# Patient Record
Sex: MALE | Race: WHITE | NOT HISPANIC OR LATINO | Employment: UNEMPLOYED | URBAN - METROPOLITAN AREA
[De-identification: names, ages, dates, MRNs, and addresses within clinical notes are randomized per-mention and may not be internally consistent; named-entity substitution may affect disease eponyms.]

---

## 2023-12-26 ENCOUNTER — APPOINTMENT (OUTPATIENT)
Dept: GENERAL RADIOLOGY | Facility: HOSPITAL | Age: 58
DRG: 194 | End: 2023-12-26
Payer: OTHER GOVERNMENT

## 2023-12-26 ENCOUNTER — HOSPITAL ENCOUNTER (INPATIENT)
Facility: HOSPITAL | Age: 58
LOS: 3 days | Discharge: HOME | DRG: 194 | End: 2023-12-30
Attending: EMERGENCY MEDICINE | Admitting: INTERNAL MEDICINE
Payer: OTHER GOVERNMENT

## 2023-12-26 DIAGNOSIS — E87.1 HYPONATREMIA: ICD-10-CM

## 2023-12-26 DIAGNOSIS — J10.1 INFLUENZA A: Primary | ICD-10-CM

## 2023-12-26 DIAGNOSIS — I15.9 SECONDARY HYPERTENSION: ICD-10-CM

## 2023-12-26 PROBLEM — E66.9 OBESITY (BMI 30-39.9): Status: ACTIVE | Noted: 2023-12-26

## 2023-12-26 PROBLEM — E87.6 HYPOKALEMIA: Status: ACTIVE | Noted: 2023-12-26

## 2023-12-26 PROBLEM — I10 ESSENTIAL HYPERTENSION: Status: ACTIVE | Noted: 2023-12-26

## 2023-12-26 PROBLEM — R79.89 ELEVATED LFTS: Status: ACTIVE | Noted: 2023-12-26

## 2023-12-26 LAB
ALBUMIN SERPL-MCNC: 4.3 G/DL (ref 3.5–5.2)
ALBUMIN/GLOB SERPL: 1.4 G/DL
ALP SERPL-CCNC: 123 U/L (ref 39–117)
ALT SERPL W P-5'-P-CCNC: 95 U/L (ref 1–41)
ANION GAP SERPL CALCULATED.3IONS-SCNC: 14 MMOL/L (ref 10–20)
ANION GAP SERPL CALCULATED.3IONS-SCNC: 15 MMOL/L (ref 5–15)
AST SERPL-CCNC: 112 U/L (ref 1–40)
B PARAPERT DNA SPEC QL NAA+PROBE: NOT DETECTED
B PERT DNA SPEC QL NAA+PROBE: NOT DETECTED
BASOPHILS # BLD AUTO: 0 10*3/MM3 (ref 0–0.2)
BASOPHILS # BLD AUTO: 0 10*3/MM3 (ref 0–0.2)
BASOPHILS NFR BLD AUTO: 0.1 % (ref 0–1.5)
BASOPHILS NFR BLD AUTO: 0.2 % (ref 0–1.5)
BILIRUB SERPL-MCNC: 0.6 MG/DL (ref 0–1.2)
BUN BLDA-MCNC: 6 MG/DL (ref 8–26)
BUN SERPL-MCNC: 7 MG/DL (ref 6–20)
BUN/CREAT SERPL: 10 (ref 7–25)
C PNEUM DNA NPH QL NAA+NON-PROBE: NOT DETECTED
CA-I BLDA-SCNC: 0.95 MMOL/L (ref 1.12–1.32)
CALCIUM SPEC-SCNC: 8.9 MG/DL (ref 8.6–10.5)
CHLORIDE BLDA-SCNC: 73 MMOL/L (ref 98–109)
CHLORIDE SERPL-SCNC: 68 MMOL/L (ref 98–107)
CO2 BLDA-SCNC: 25 MMOL/L (ref 24–29)
CO2 SERPL-SCNC: 28 MMOL/L (ref 22–29)
CREAT BLDA-MCNC: 0.6 MG/DL (ref 0.6–1.3)
CREAT SERPL-MCNC: 0.7 MG/DL (ref 0.76–1.27)
DEPRECATED RDW RBC AUTO: 42 FL (ref 37–54)
DEPRECATED RDW RBC AUTO: 42.4 FL (ref 37–54)
EGFRCR SERPLBLD CKD-EPI 2021: 106.1 ML/MIN/1.73
EGFRCR SERPLBLD CKD-EPI 2021: 111.2 ML/MIN/1.73
EOSINOPHIL # BLD AUTO: 0 10*3/MM3 (ref 0–0.4)
EOSINOPHIL # BLD AUTO: 0 10*3/MM3 (ref 0–0.4)
EOSINOPHIL NFR BLD AUTO: 0 % (ref 0.3–6.2)
EOSINOPHIL NFR BLD AUTO: 0.1 % (ref 0.3–6.2)
ERYTHROCYTE [DISTWIDTH] IN BLOOD BY AUTOMATED COUNT: 12.4 % (ref 12.3–15.4)
ERYTHROCYTE [DISTWIDTH] IN BLOOD BY AUTOMATED COUNT: 12.6 % (ref 12.3–15.4)
ETHANOL UR QL: <0.01 %
FLUAV H1 2009 PAND RNA NPH QL NAA+PROBE: DETECTED
FLUBV RNA ISLT QL NAA+PROBE: NOT DETECTED
GLOBULIN UR ELPH-MCNC: 3 GM/DL
GLUCOSE BLDC GLUCOMTR-MCNC: 143 MG/DL (ref 70–105)
GLUCOSE SERPL-MCNC: 134 MG/DL (ref 65–99)
HADV DNA SPEC NAA+PROBE: NOT DETECTED
HCOV 229E RNA SPEC QL NAA+PROBE: NOT DETECTED
HCOV HKU1 RNA SPEC QL NAA+PROBE: NOT DETECTED
HCOV NL63 RNA SPEC QL NAA+PROBE: NOT DETECTED
HCOV OC43 RNA SPEC QL NAA+PROBE: NOT DETECTED
HCT VFR BLD AUTO: 41.7 % (ref 37.5–51)
HCT VFR BLD AUTO: 47.4 % (ref 37.5–51)
HCT VFR BLDA CALC: 45 % (ref 38–51)
HGB BLD-MCNC: 15.1 G/DL (ref 13–17.7)
HGB BLD-MCNC: 17.1 G/DL (ref 13–17.7)
HGB BLDA-MCNC: 15.3 G/DL (ref 12–17)
HMPV RNA NPH QL NAA+NON-PROBE: NOT DETECTED
HPIV1 RNA ISLT QL NAA+PROBE: NOT DETECTED
HPIV2 RNA SPEC QL NAA+PROBE: NOT DETECTED
HPIV3 RNA NPH QL NAA+PROBE: NOT DETECTED
HPIV4 P GENE NPH QL NAA+PROBE: NOT DETECTED
LYMPHOCYTES # BLD AUTO: 0.5 10*3/MM3 (ref 0.7–3.1)
LYMPHOCYTES # BLD AUTO: 0.5 10*3/MM3 (ref 0.7–3.1)
LYMPHOCYTES NFR BLD AUTO: 6.8 % (ref 19.6–45.3)
LYMPHOCYTES NFR BLD AUTO: 9.4 % (ref 19.6–45.3)
M PNEUMO IGG SER IA-ACNC: NOT DETECTED
MCH RBC QN AUTO: 33.2 PG (ref 26.6–33)
MCH RBC QN AUTO: 34.2 PG (ref 26.6–33)
MCHC RBC AUTO-ENTMCNC: 36.1 G/DL (ref 31.5–35.7)
MCHC RBC AUTO-ENTMCNC: 36.2 G/DL (ref 31.5–35.7)
MCV RBC AUTO: 91.8 FL (ref 79–97)
MCV RBC AUTO: 94.8 FL (ref 79–97)
MONOCYTES # BLD AUTO: 0.2 10*3/MM3 (ref 0.1–0.9)
MONOCYTES # BLD AUTO: 0.4 10*3/MM3 (ref 0.1–0.9)
MONOCYTES NFR BLD AUTO: 5 % (ref 5–12)
MONOCYTES NFR BLD AUTO: 6.1 % (ref 5–12)
NEUTROPHILS NFR BLD AUTO: 4.1 10*3/MM3 (ref 1.7–7)
NEUTROPHILS NFR BLD AUTO: 5.9 10*3/MM3 (ref 1.7–7)
NEUTROPHILS NFR BLD AUTO: 85.5 % (ref 42.7–76)
NEUTROPHILS NFR BLD AUTO: 86.8 % (ref 42.7–76)
NRBC BLD AUTO-RTO: 0.1 /100 WBC (ref 0–0.2)
NRBC BLD AUTO-RTO: 0.2 /100 WBC (ref 0–0.2)
NT-PROBNP SERPL-MCNC: 105.7 PG/ML (ref 0–900)
PLATELET # BLD AUTO: 177 10*3/MM3 (ref 140–450)
PLATELET # BLD AUTO: 222 10*3/MM3 (ref 140–450)
PMV BLD AUTO: 8.4 FL (ref 6–12)
PMV BLD AUTO: 8.4 FL (ref 6–12)
POTASSIUM BLDA-SCNC: 3.1 MMOL/L (ref 3.5–4.9)
POTASSIUM SERPL-SCNC: 3.1 MMOL/L (ref 3.5–5.2)
PROT SERPL-MCNC: 7.3 G/DL (ref 6–8.5)
RBC # BLD AUTO: 4.54 10*6/MM3 (ref 4.14–5.8)
RBC # BLD AUTO: 5 10*6/MM3 (ref 4.14–5.8)
RHINOVIRUS RNA SPEC NAA+PROBE: NOT DETECTED
RSV RNA NPH QL NAA+NON-PROBE: NOT DETECTED
SARS-COV-2 RNA NPH QL NAA+NON-PROBE: NOT DETECTED
SODIUM BLD-SCNC: 108 MMOL/L (ref 138–146)
SODIUM SERPL-SCNC: 111 MMOL/L (ref 136–145)
TROPONIN T SERPL HS-MCNC: 9 NG/L
WBC NRBC COR # BLD AUTO: 4.8 10*3/MM3 (ref 3.4–10.8)
WBC NRBC COR # BLD AUTO: 6.8 10*3/MM3 (ref 3.4–10.8)

## 2023-12-26 PROCEDURE — 71046 X-RAY EXAM CHEST 2 VIEWS: CPT

## 2023-12-26 PROCEDURE — 99285 EMERGENCY DEPT VISIT HI MDM: CPT

## 2023-12-26 PROCEDURE — 84484 ASSAY OF TROPONIN QUANT: CPT | Performed by: PHYSICIAN ASSISTANT

## 2023-12-26 PROCEDURE — 85025 COMPLETE CBC W/AUTO DIFF WBC: CPT | Performed by: NURSE PRACTITIONER

## 2023-12-26 PROCEDURE — 85014 HEMATOCRIT: CPT

## 2023-12-26 PROCEDURE — 82077 ASSAY SPEC XCP UR&BREATH IA: CPT | Performed by: NURSE PRACTITIONER

## 2023-12-26 PROCEDURE — 80047 BASIC METABLC PNL IONIZED CA: CPT

## 2023-12-26 PROCEDURE — 93005 ELECTROCARDIOGRAM TRACING: CPT

## 2023-12-26 PROCEDURE — 83880 ASSAY OF NATRIURETIC PEPTIDE: CPT | Performed by: PHYSICIAN ASSISTANT

## 2023-12-26 PROCEDURE — 80053 COMPREHEN METABOLIC PANEL: CPT | Performed by: PHYSICIAN ASSISTANT

## 2023-12-26 PROCEDURE — G0378 HOSPITAL OBSERVATION PER HR: HCPCS

## 2023-12-26 PROCEDURE — 85025 COMPLETE CBC W/AUTO DIFF WBC: CPT | Performed by: PHYSICIAN ASSISTANT

## 2023-12-26 PROCEDURE — 25810000003 SODIUM CHLORIDE 0.9 % SOLUTION: Performed by: EMERGENCY MEDICINE

## 2023-12-26 PROCEDURE — 0202U NFCT DS 22 TRGT SARS-COV-2: CPT | Performed by: PHYSICIAN ASSISTANT

## 2023-12-26 PROCEDURE — 83036 HEMOGLOBIN GLYCOSYLATED A1C: CPT | Performed by: NURSE PRACTITIONER

## 2023-12-26 PROCEDURE — 93005 ELECTROCARDIOGRAM TRACING: CPT | Performed by: EMERGENCY MEDICINE

## 2023-12-26 RX ORDER — ACETAMINOPHEN 325 MG/1
650 TABLET ORAL EVERY 6 HOURS PRN
Status: DISCONTINUED | OUTPATIENT
Start: 2023-12-26 | End: 2023-12-30 | Stop reason: HOSPADM

## 2023-12-26 RX ORDER — AMLODIPINE BESYLATE 5 MG/1
10 TABLET ORAL ONCE
Qty: 2 TABLET | Refills: 0 | Status: COMPLETED | OUTPATIENT
Start: 2023-12-26 | End: 2023-12-26

## 2023-12-26 RX ORDER — HYDROCODONE BITARTRATE AND ACETAMINOPHEN 5; 325 MG/1; MG/1
1 TABLET ORAL ONCE AS NEEDED
Status: COMPLETED | OUTPATIENT
Start: 2023-12-26 | End: 2023-12-27

## 2023-12-26 RX ORDER — BENZONATATE 100 MG/1
100 CAPSULE ORAL 3 TIMES DAILY PRN
Status: DISCONTINUED | OUTPATIENT
Start: 2023-12-26 | End: 2023-12-30 | Stop reason: HOSPADM

## 2023-12-26 RX ORDER — ASPIRIN 81 MG/1
81 TABLET ORAL DAILY
COMMUNITY

## 2023-12-26 RX ORDER — OMEPRAZOLE 20 MG/1
20 CAPSULE, DELAYED RELEASE ORAL DAILY
COMMUNITY

## 2023-12-26 RX ORDER — SODIUM CHLORIDE 0.9 % (FLUSH) 0.9 %
10 SYRINGE (ML) INJECTION AS NEEDED
Status: DISCONTINUED | OUTPATIENT
Start: 2023-12-26 | End: 2023-12-30 | Stop reason: HOSPADM

## 2023-12-26 RX ORDER — LABETALOL HYDROCHLORIDE 5 MG/ML
10 INJECTION, SOLUTION INTRAVENOUS EVERY 6 HOURS PRN
Status: DISCONTINUED | OUTPATIENT
Start: 2023-12-26 | End: 2023-12-30 | Stop reason: HOSPADM

## 2023-12-26 RX ORDER — ALBUTEROL SULFATE 90 UG/1
2 AEROSOL, METERED RESPIRATORY (INHALATION) EVERY 4 HOURS PRN
Status: DISCONTINUED | OUTPATIENT
Start: 2023-12-26 | End: 2023-12-30 | Stop reason: HOSPADM

## 2023-12-26 RX ORDER — SODIUM CHLORIDE 9 MG/ML
150 INJECTION, SOLUTION INTRAVENOUS CONTINUOUS
Status: DISCONTINUED | OUTPATIENT
Start: 2023-12-26 | End: 2023-12-27

## 2023-12-26 RX ORDER — CETIRIZINE HYDROCHLORIDE 10 MG/1
10 TABLET ORAL DAILY
COMMUNITY

## 2023-12-26 RX ORDER — HYDROCODONE BITARTRATE AND HOMATROPINE METHYLBROMIDE ORAL SOLUTION 5; 1.5 MG/5ML; MG/5ML
5 LIQUID ORAL ONCE AS NEEDED
Status: COMPLETED | OUTPATIENT
Start: 2023-12-26 | End: 2023-12-26

## 2023-12-26 RX ORDER — OXYMETAZOLINE HYDROCHLORIDE 0.05 G/100ML
2 SPRAY NASAL 2 TIMES DAILY
Qty: 6 ML | Refills: 0 | Status: COMPLETED | OUTPATIENT
Start: 2023-12-26 | End: 2023-12-29

## 2023-12-26 RX ADMIN — HYDROCODONE BITARTRATE AND HOMATROPINE METHYLBROMIDE 5 ML: 1.5; 5 SOLUTION ORAL at 23:44

## 2023-12-26 RX ADMIN — AMLODIPINE BESYLATE 10 MG: 5 TABLET ORAL at 18:35

## 2023-12-26 RX ADMIN — SODIUM CHLORIDE 150 ML/HR: 9 INJECTION, SOLUTION INTRAVENOUS at 18:37

## 2023-12-26 RX ADMIN — Medication 2 SPRAY: at 23:43

## 2023-12-26 RX ADMIN — METOPROLOL TARTRATE 25 MG: 25 TABLET, FILM COATED ORAL at 23:44

## 2023-12-26 NOTE — ED PROVIDER NOTES
Subjective   Provider in Triage Note  Patient is a 58-year-old male PMH significant for hypertension presenting to the ED with complaints of cough congestion for the past 5 days.  He reports he has had increased dyspnea and pleuritic type chest pain today.  He states his chest pain is worse with coughing spells.  He denies any unilateral leg swelling.  Patient's wife in triage also reports she has been sick with similar symptoms.  They report subjective fever no chills.  No edema.    Due to significant overcrowding in the emergency department patient was initially seen and evaluated in triage.  Provider in triage recommended patient placed in treatment area to initiate therapy and movement to an ER bed as soon as possible.        History of Present Illness  I interviewed the patient for HPI and agree with the nurse practitioner providing triage note as noted above  Review of Systems    No past medical history on file.    No Known Allergies    No past surgical history on file.    No family history on file.    Social History     Socioeconomic History    Marital status:            Objective   Physical Exam  Neck has no adenopathy JVD or bruits.  Lungs are clear.  Heart has regular rate rhythm without murmur rub or gallop.  Chest is nontender.  Abdomen is soft nontender.  Back no tenderness.  Extremities M is no cyanosis or edema.  Procedures  My EKG interpretation shows normal sinus rhythm at a rate of 80 with no acute ST change         ED Course      Results for orders placed or performed during the hospital encounter of 12/26/23   Respiratory Panel PCR w/COVID-19(SARS-CoV-2) FABY/BRIAN/KARLIE/PAD/COR/KYLAH In-House, NP Swab in UTM/VTM, 2 HR TAT - Swab, Nasopharynx    Specimen: Nasopharynx; Swab   Result Value Ref Range    ADENOVIRUS, PCR Not Detected Not Detected    Coronavirus 229E Not Detected Not Detected    Coronavirus HKU1 Not Detected Not Detected    Coronavirus NL63 Not Detected Not Detected    Coronavirus OC43  Not Detected Not Detected    COVID19 Not Detected Not Detected - Ref. Range    Human Metapneumovirus Not Detected Not Detected    Human Rhinovirus/Enterovirus Not Detected Not Detected    Influenza A H1 2009 PCR Detected (A) Not Detected    Influenza B PCR Not Detected Not Detected    Parainfluenza Virus 1 Not Detected Not Detected    Parainfluenza Virus 2 Not Detected Not Detected    Parainfluenza Virus 3 Not Detected Not Detected    Parainfluenza Virus 4 Not Detected Not Detected    RSV, PCR Not Detected Not Detected    Bordetella pertussis pcr Not Detected Not Detected    Bordetella parapertussis PCR Not Detected Not Detected    Chlamydophila pneumoniae PCR Not Detected Not Detected    Mycoplasma pneumo by PCR Not Detected Not Detected   Comprehensive Metabolic Panel    Specimen: Blood   Result Value Ref Range    Glucose 134 (H) 65 - 99 mg/dL    BUN 7 6 - 20 mg/dL    Creatinine 0.70 (L) 0.76 - 1.27 mg/dL    Sodium 111 (C) 136 - 145 mmol/L    Potassium 3.1 (L) 3.5 - 5.2 mmol/L    Chloride 68 (L) 98 - 107 mmol/L    CO2 28.0 22.0 - 29.0 mmol/L    Calcium 8.9 8.6 - 10.5 mg/dL    Total Protein 7.3 6.0 - 8.5 g/dL    Albumin 4.3 3.5 - 5.2 g/dL    ALT (SGPT) 95 (H) 1 - 41 U/L    AST (SGOT) 112 (H) 1 - 40 U/L    Alkaline Phosphatase 123 (H) 39 - 117 U/L    Total Bilirubin 0.6 0.0 - 1.2 mg/dL    Globulin 3.0 gm/dL    A/G Ratio 1.4 g/dL    BUN/Creatinine Ratio 10.0 7.0 - 25.0    Anion Gap 15.0 5.0 - 15.0 mmol/L    eGFR 106.1 >60.0 mL/min/1.73   BNP    Specimen: Blood   Result Value Ref Range    proBNP 105.7 0.0 - 900.0 pg/mL   Single High Sensitivity Troponin T    Specimen: Blood   Result Value Ref Range    HS Troponin T 9 <22 ng/L   CBC Auto Differential    Specimen: Blood   Result Value Ref Range    WBC 6.80 3.40 - 10.80 10*3/mm3    RBC 5.00 4.14 - 5.80 10*6/mm3    Hemoglobin 17.1 13.0 - 17.7 g/dL    Hematocrit 47.4 37.5 - 51.0 %    MCV 94.8 79.0 - 97.0 fL    MCH 34.2 (H) 26.6 - 33.0 pg    MCHC 36.1 (H) 31.5 - 35.7 g/dL     RDW 12.6 12.3 - 15.4 %    RDW-SD 42.0 37.0 - 54.0 fl    MPV 8.4 6.0 - 12.0 fL    Platelets 222 140 - 450 10*3/mm3    Neutrophil % 86.8 (H) 42.7 - 76.0 %    Lymphocyte % 6.8 (L) 19.6 - 45.3 %    Monocyte % 6.1 5.0 - 12.0 %    Eosinophil % 0.1 (L) 0.3 - 6.2 %    Basophil % 0.2 0.0 - 1.5 %    Neutrophils, Absolute 5.90 1.70 - 7.00 10*3/mm3    Lymphocytes, Absolute 0.50 (L) 0.70 - 3.10 10*3/mm3    Monocytes, Absolute 0.40 0.10 - 0.90 10*3/mm3    Eosinophils, Absolute 0.00 0.00 - 0.40 10*3/mm3    Basophils, Absolute 0.00 0.00 - 0.20 10*3/mm3    nRBC 0.2 0.0 - 0.2 /100 WBC   POC CHEM 8    Specimen: Venous Blood   Result Value Ref Range    Glucose 143 (H) 70 - 105 mg/dL    BUN 6 (L) 8 - 26 mg/dL    Creatinine 0.60 0.60 - 1.30 mg/dL    Sodium 108 (L) 138 - 146 mmol/L    POC Potassium 3.1 (L) 3.5 - 4.9 mmol/L    Chloride 73 (L) 98 - 109 mmol/L    Total CO2 25 24 - 29 mmol/L    Anion Gap 14.0 10.0 - 20.0 mmol/L    Hemoglobin 15.3 12.0 - 17.0 g/dL    Hematocrit 45 38 - 51 %    Ionized Calcium 0.95 (L) 1.12 - 1.32 mmol/L    eGFR 111.2 >60.0 mL/min/1.73   ECG 12 Lead Chest Pain   Result Value Ref Range    QT Interval 405 ms    QTC Interval 467 ms     XR Chest 2 View    Result Date: 12/26/2023  Impression: Cardiomegaly. No acute cardiopulmonary abnormality. Electronically Signed: Madai Matta MD  12/26/2023 5:35 PM EST  Workstation ID: [id number]                                            Medical Decision Making  Chest x-ray interpretation with cardiomegaly without effusion or infiltrate.  Metabolic panel shows sodium at 111 with potassium 3.1.  There is no renal insufficiency.  Patient has no leukocytosis no left shift and no anemia.  BNP was 500.  Troponin was normal.  Viral panel is positive for influenza A.  Patient has had longstanding hypertension that he has not taken medication for.  Patient will be given Norvasc p.o.  Will be admitted for influenza A hypertension and severe hyponatremia.  Patient will be started on IV  sodium replacement.  I did speak to the on-call hospitalist.    Amount and/or Complexity of Data Reviewed  Labs: ordered. Decision-making details documented in ED Course.  Radiology: ordered and independent interpretation performed.  ECG/medicine tests: ordered and independent interpretation performed.    Risk  Prescription drug management.  Decision regarding hospitalization.        Final diagnoses:   Influenza A   Hyponatremia   Secondary hypertension       ED Disposition  ED Disposition       ED Disposition   Decision to Admit    Condition   --    Comment   --               No follow-up provider specified.       Medication List      No changes were made to your prescriptions during this visit.            Hao Rangel MD  12/26/23 9499

## 2023-12-26 NOTE — Clinical Note
Level of Care: Progressive Care [20]   Diagnosis: Hyponatremia [198519]   Admitting Physician: DEONDRE CAMEJO [[id number]]   Attending Physician: DEONDRE CAMEJO [[id number]]   Bed Request Comments: cardiac monitor

## 2023-12-27 ENCOUNTER — TELEPHONE (OUTPATIENT)
Dept: FAMILY MEDICINE CLINIC | Facility: CLINIC | Age: 58
End: 2023-12-27

## 2023-12-27 LAB
ANION GAP SERPL CALCULATED.3IONS-SCNC: 14 MMOL/L (ref 5–15)
ANION GAP SERPL CALCULATED.3IONS-SCNC: 14 MMOL/L (ref 5–15)
ANION GAP SERPL CALCULATED.3IONS-SCNC: 9 MMOL/L (ref 5–15)
BUN SERPL-MCNC: 10 MG/DL (ref 6–20)
BUN SERPL-MCNC: 7 MG/DL (ref 6–20)
BUN SERPL-MCNC: 8 MG/DL (ref 6–20)
BUN/CREAT SERPL: 11.3 (ref 7–25)
BUN/CREAT SERPL: 12.1 (ref 7–25)
BUN/CREAT SERPL: 12.3 (ref 7–25)
CALCIUM SPEC-SCNC: 8.6 MG/DL (ref 8.6–10.5)
CALCIUM SPEC-SCNC: 8.7 MG/DL (ref 8.6–10.5)
CALCIUM SPEC-SCNC: 8.8 MG/DL (ref 8.6–10.5)
CHLORIDE SERPL-SCNC: 69 MMOL/L (ref 98–107)
CHLORIDE SERPL-SCNC: 75 MMOL/L (ref 98–107)
CHLORIDE SERPL-SCNC: 83 MMOL/L (ref 98–107)
CO2 SERPL-SCNC: 23 MMOL/L (ref 22–29)
CO2 SERPL-SCNC: 25 MMOL/L (ref 22–29)
CO2 SERPL-SCNC: 27 MMOL/L (ref 22–29)
CREAT SERPL-MCNC: 0.58 MG/DL (ref 0.76–1.27)
CREAT SERPL-MCNC: 0.71 MG/DL (ref 0.76–1.27)
CREAT SERPL-MCNC: 0.81 MG/DL (ref 0.76–1.27)
EGFRCR SERPLBLD CKD-EPI 2021: 101.6 ML/MIN/1.73
EGFRCR SERPLBLD CKD-EPI 2021: 105.7 ML/MIN/1.73
EGFRCR SERPLBLD CKD-EPI 2021: 112.3 ML/MIN/1.73
GLUCOSE SERPL-MCNC: 108 MG/DL (ref 65–99)
GLUCOSE SERPL-MCNC: 122 MG/DL (ref 65–99)
GLUCOSE SERPL-MCNC: 161 MG/DL (ref 65–99)
HBA1C MFR BLD: 5.8 % (ref 4.8–5.6)
MAGNESIUM SERPL-MCNC: 1.8 MG/DL (ref 1.6–2.6)
NT-PROBNP SERPL-MCNC: 163.1 PG/ML (ref 0–900)
OSMOLALITY SERPL: 235 MOSM/KG (ref 275–295)
OSMOLALITY SERPL: 245 MOSM/KG (ref 275–295)
OSMOLALITY SERPL: 247 MOSM/KG (ref 275–295)
OSMOLALITY UR: 230 MOSM/KG (ref 300–800)
POTASSIUM SERPL-SCNC: 3.6 MMOL/L (ref 3.5–5.2)
POTASSIUM SERPL-SCNC: 3.7 MMOL/L (ref 3.5–5.2)
POTASSIUM SERPL-SCNC: 4.1 MMOL/L (ref 3.5–5.2)
QT INTERVAL: 405 MS
QTC INTERVAL: 467 MS
SODIUM SERPL-SCNC: 108 MMOL/L (ref 136–145)
SODIUM SERPL-SCNC: 112 MMOL/L (ref 136–145)
SODIUM SERPL-SCNC: 118 MMOL/L (ref 136–145)
SODIUM SERPL-SCNC: 119 MMOL/L (ref 136–145)
SODIUM UR-SCNC: <20 MMOL/L
TSH SERPL DL<=0.05 MIU/L-ACNC: 5.12 UIU/ML (ref 0.27–4.2)

## 2023-12-27 PROCEDURE — 02HV33Z INSERTION OF INFUSION DEVICE INTO SUPERIOR VENA CAVA, PERCUTANEOUS APPROACH: ICD-10-PCS | Performed by: INTERNAL MEDICINE

## 2023-12-27 PROCEDURE — 0 DEXTROSE 5 % SOLUTION: Performed by: INTERNAL MEDICINE

## 2023-12-27 PROCEDURE — 83930 ASSAY OF BLOOD OSMOLALITY: CPT | Performed by: INTERNAL MEDICINE

## 2023-12-27 PROCEDURE — 25810000003 SODIUM CHLORIDE 3 % SOLUTION: Performed by: INTERNAL MEDICINE

## 2023-12-27 PROCEDURE — 25010000002 THIAMINE HCL 200 MG/2ML SOLUTION

## 2023-12-27 PROCEDURE — 83735 ASSAY OF MAGNESIUM: CPT

## 2023-12-27 PROCEDURE — 84443 ASSAY THYROID STIM HORMONE: CPT | Performed by: INTERNAL MEDICINE

## 2023-12-27 PROCEDURE — 83935 ASSAY OF URINE OSMOLALITY: CPT | Performed by: INTERNAL MEDICINE

## 2023-12-27 PROCEDURE — 80048 BASIC METABOLIC PNL TOTAL CA: CPT | Performed by: INTERNAL MEDICINE

## 2023-12-27 PROCEDURE — 84295 ASSAY OF SERUM SODIUM: CPT | Performed by: INTERNAL MEDICINE

## 2023-12-27 PROCEDURE — 84300 ASSAY OF URINE SODIUM: CPT | Performed by: INTERNAL MEDICINE

## 2023-12-27 PROCEDURE — 83880 ASSAY OF NATRIURETIC PEPTIDE: CPT | Performed by: INTERNAL MEDICINE

## 2023-12-27 PROCEDURE — C1751 CATH, INF, PER/CENT/MIDLINE: HCPCS

## 2023-12-27 RX ORDER — OSELTAMIVIR PHOSPHATE 75 MG/1
75 CAPSULE ORAL EVERY 12 HOURS SCHEDULED
Status: DISCONTINUED | OUTPATIENT
Start: 2023-12-27 | End: 2023-12-28

## 2023-12-27 RX ORDER — THIAMINE HYDROCHLORIDE 100 MG/ML
200 INJECTION, SOLUTION INTRAMUSCULAR; INTRAVENOUS EVERY 8 HOURS SCHEDULED
Status: DISCONTINUED | OUTPATIENT
Start: 2023-12-27 | End: 2023-12-30 | Stop reason: HOSPADM

## 2023-12-27 RX ORDER — HYDROCODONE BITARTRATE AND HOMATROPINE METHYLBROMIDE ORAL SOLUTION 5; 1.5 MG/5ML; MG/5ML
5 LIQUID ORAL EVERY 6 HOURS PRN
Status: COMPLETED | OUTPATIENT
Start: 2023-12-27 | End: 2023-12-27

## 2023-12-27 RX ORDER — SODIUM CHLORIDE 0.9 % (FLUSH) 0.9 %
10 SYRINGE (ML) INJECTION EVERY 12 HOURS SCHEDULED
Status: DISCONTINUED | OUTPATIENT
Start: 2023-12-27 | End: 2023-12-30 | Stop reason: HOSPADM

## 2023-12-27 RX ORDER — SODIUM CHLORIDE 0.9 % (FLUSH) 0.9 %
20 SYRINGE (ML) INJECTION AS NEEDED
Status: DISCONTINUED | OUTPATIENT
Start: 2023-12-27 | End: 2023-12-30 | Stop reason: HOSPADM

## 2023-12-27 RX ORDER — POTASSIUM CHLORIDE 20 MEQ/1
40 TABLET, EXTENDED RELEASE ORAL ONCE
Status: COMPLETED | OUTPATIENT
Start: 2023-12-27 | End: 2023-12-27

## 2023-12-27 RX ORDER — HYDROCODONE BITARTRATE AND ACETAMINOPHEN 5; 325 MG/1; MG/1
1 TABLET ORAL EVERY 6 HOURS PRN
Status: DISCONTINUED | OUTPATIENT
Start: 2023-12-27 | End: 2023-12-30 | Stop reason: HOSPADM

## 2023-12-27 RX ORDER — 3% SODIUM CHLORIDE 3 G/100ML
25 INJECTION, SOLUTION INTRAVENOUS CONTINUOUS
Status: DISCONTINUED | OUTPATIENT
Start: 2023-12-27 | End: 2023-12-27

## 2023-12-27 RX ORDER — FOLIC ACID 1 MG/1
1 TABLET ORAL DAILY
Status: DISCONTINUED | OUTPATIENT
Start: 2023-12-27 | End: 2023-12-30 | Stop reason: HOSPADM

## 2023-12-27 RX ORDER — AMLODIPINE BESYLATE 5 MG/1
5 TABLET ORAL
Status: DISCONTINUED | OUTPATIENT
Start: 2023-12-27 | End: 2023-12-30 | Stop reason: HOSPADM

## 2023-12-27 RX ORDER — SODIUM CHLORIDE 0.9 % (FLUSH) 0.9 %
10 SYRINGE (ML) INJECTION AS NEEDED
Status: DISCONTINUED | OUTPATIENT
Start: 2023-12-27 | End: 2023-12-30 | Stop reason: HOSPADM

## 2023-12-27 RX ORDER — DEXTROSE MONOHYDRATE 50 MG/ML
50 INJECTION, SOLUTION INTRAVENOUS CONTINUOUS
Status: DISPENSED | OUTPATIENT
Start: 2023-12-27 | End: 2023-12-27

## 2023-12-27 RX ORDER — DEXTROSE MONOHYDRATE 50 MG/ML
100 INJECTION, SOLUTION INTRAVENOUS CONTINUOUS
Status: DISCONTINUED | OUTPATIENT
Start: 2023-12-28 | End: 2023-12-28

## 2023-12-27 RX ORDER — SODIUM CHLORIDE 9 MG/ML
40 INJECTION, SOLUTION INTRAVENOUS AS NEEDED
Status: DISCONTINUED | OUTPATIENT
Start: 2023-12-27 | End: 2023-12-30 | Stop reason: HOSPADM

## 2023-12-27 RX ADMIN — ACETAMINOPHEN 650 MG: 325 TABLET, FILM COATED ORAL at 11:39

## 2023-12-27 RX ADMIN — THIAMINE HYDROCHLORIDE 200 MG: 100 INJECTION, SOLUTION INTRAMUSCULAR; INTRAVENOUS at 14:12

## 2023-12-27 RX ADMIN — FOLIC ACID 1 MG: 1 TABLET ORAL at 10:03

## 2023-12-27 RX ADMIN — BENZONATATE 100 MG: 100 CAPSULE ORAL at 20:56

## 2023-12-27 RX ADMIN — Medication 10 ML: at 20:49

## 2023-12-27 RX ADMIN — Medication 2 SPRAY: at 10:02

## 2023-12-27 RX ADMIN — HYDROCODONE BITARTRATE AND ACETAMINOPHEN 1 TABLET: 5; 325 TABLET ORAL at 20:56

## 2023-12-27 RX ADMIN — SODIUM CHLORIDE 25 ML/HR: 3 INJECTION, SOLUTION INTRAVENOUS at 09:13

## 2023-12-27 RX ADMIN — METOPROLOL TARTRATE 25 MG: 25 TABLET, FILM COATED ORAL at 20:46

## 2023-12-27 RX ADMIN — POTASSIUM CHLORIDE 40 MEQ: 1500 TABLET, EXTENDED RELEASE ORAL at 10:03

## 2023-12-27 RX ADMIN — THIAMINE HYDROCHLORIDE 200 MG: 100 INJECTION, SOLUTION INTRAMUSCULAR; INTRAVENOUS at 22:17

## 2023-12-27 RX ADMIN — BENZONATATE 100 MG: 100 CAPSULE ORAL at 11:39

## 2023-12-27 RX ADMIN — METOPROLOL TARTRATE 25 MG: 25 TABLET, FILM COATED ORAL at 10:03

## 2023-12-27 RX ADMIN — HYDROCODONE BITARTRATE AND HOMATROPINE METHYLBROMIDE 5 ML: 1.5; 5 SOLUTION ORAL at 12:09

## 2023-12-27 RX ADMIN — DEXTROSE MONOHYDRATE 50 ML/HR: 50 INJECTION, SOLUTION INTRAVENOUS at 17:11

## 2023-12-27 RX ADMIN — AMLODIPINE BESYLATE 5 MG: 5 TABLET ORAL at 14:12

## 2023-12-27 RX ADMIN — HYDROCODONE BITARTRATE AND ACETAMINOPHEN 1 TABLET: 5; 325 TABLET ORAL at 03:05

## 2023-12-27 RX ADMIN — Medication 2 SPRAY: at 20:46

## 2023-12-27 RX ADMIN — THIAMINE HYDROCHLORIDE 200 MG: 100 INJECTION, SOLUTION INTRAMUSCULAR; INTRAVENOUS at 10:07

## 2023-12-27 RX ADMIN — OSELTAMIVIR PHOSPHATE 75 MG: 75 CAPSULE ORAL at 20:46

## 2023-12-27 NOTE — NURSING NOTE
Pt placed in seizure precautions do to decreased sodium level. Sides of bed padded with blankets, bed in lowest position, and continuous cardiac monitoring.

## 2023-12-27 NOTE — SIGNIFICANT NOTE
Patient sodium level went down to 108.  Patient will require to be transferred to the ICU.  BMP every 4 hours nephrology consult and probably 3% infusion    Discussed with the ICU team and the patient will be transferred to the ICU service

## 2023-12-27 NOTE — CONSULTS
PICC Line Insertion Procedure Note    Procedure: Insertion of #5 FR/16G PICC    Indications:  3% saline    Active Time Out:  Correct patient: Yes  Correct procedure: Yes  Correct site: Yes  Verified with: ELISSA Reyes    Procedure Details:  Informed consent was obtained for the procedure.  Risk include, but are not limited to infection, air embolism, catheter tip moving, catheter blockage and phlebitis.     Maximum sterile technique was used including antiseptics, cap, gloves, gown, hand hygiene, mask, and sheet.    Ultrasound Guidance: Yes    #5 FR/16G PICC inserted to the R Brachial vein per hospital protocol by ELISSA Nagel.   Non-pulsatile blood return: yes    Lot #: [id number]  Expiration date: 06-    Complications:  Unable to advance introducer in brachial vein with first attempt. Moved up without incident.    Findings:  Catheter inserted to 43 cm, with 3 cm exposed.   Mid upper arm circumference is 31 cm.   Catheter was flushed with 20 cc NS and sterile dressing applied.  Patient tolerated procedure well.  PICC tip verified by:       [x] Sapiens 3cg       [] Chest X-ray    Recommendations:  Verbal and/or written Care/Maintenance instructions provided to patient.   Primary nurse notified.    Jackeline Nagel RN  12/27/23  10:43 EST

## 2023-12-27 NOTE — CONSULTS
Consults  [facility] Consult Note      Patient Name: Manoj Cleary  : 1965  MRN: [id number]  Primary Care Physician:  Provider, No Known  Referring Physician: No Known Provider  Date of admission: 2023    Subjective     Reason for Consult:  hyponatremia    HPI:   Manoj Cleary is a 58 y.o. male admitted with hyponatremia.  His serum sodium level was 108 this morning prompting renal consultation.  I stopped his iv normal saline and started 3% saline.  He has been feeling progressively worse for 3 days.  His family has the flu and he tested positive for influenza a.  He had a lot of nausea and worsening headache.  He was forcing liquids and was mainly drinking water.  He hasn't had much to eat in 3 days.  He denies diuretic use or diarrhea. He has had hypertension for a long time and has been prescribed metoprolol in the past but doesn't take it and doesn't go to a pcp.    Review of Systems:   14 point review of systems is otherwise negative except for mentioned above on HPI    Personal History     Past Medical History:   Diagnosis Date    Hypertension     Hyponatremia        Past Surgical History:   Procedure Laterality Date    HERNIA REPAIR         Family History: family history includes Heart disease in his father.    Social History:  reports that he has been smoking cigars. He has been exposed to tobacco smoke. He has never used smokeless tobacco. He reports current alcohol use of about 20.0 standard drinks of alcohol per week. He reports that he does not use drugs.    Home Medications:  Prior to Admission medications    Medication Sig Start Date End Date Taking? Authorizing Provider   aspirin 81 MG EC tablet Take 1 tablet by mouth Daily.   Yes Annie Winters MD   cetirizine (zyrTEC) 10 MG tablet Take 1 tablet by mouth Daily.   Yes Annie Winters MD   omeprazole (priLOSEC) 20 MG capsule Take 1 capsule by mouth Daily.   Yes Annie Winters MD  "      Allergies:  No Known Allergies    Objective     Vitals:   Temp:  [98.3 °F (36.8 °C)-98.4 °F (36.9 °C)] 98.3 °F (36.8 °C)  Heart Rate:  [61-85] 64  Resp:  [14-16] 16  BP: (131-198)/() 157/93  Flow (L/min):  [2] 2    Intake/Output Summary (Last 24 hours) at 12/27/2023 1303  Last data filed at 12/27/2023 0744  Gross per 24 hour   Intake 1000 ml   Output 2200 ml   Net -1200 ml       Physical Exam:    General Appearance: alert, oriented x 3, no acute distress   Skin: warm and dry  HEENT: oral mucosa normal, nonicteric sclera  Neck: supple, no JVD  Lungs: CTA  Heart: RRR, normal S1 and S2  Abdomen: soft, nontender, nondistended  : no palpable bladder  Extremities: no edema, cyanosis or clubbing  Neuro: normal speech and mental status     Scheduled Meds:     folic acid, 1 mg, Oral, Daily  metoprolol tartrate, 25 mg, Oral, Q12H  oxymetazoline, 2 spray, Each Nare, BID  sodium chloride, 10 mL, Intravenous, Q12H  sodium chloride, 10 mL, Intravenous, Q12H  thiamine (B-1) IV, 200 mg, Intravenous, Q8H   Followed by  [START ON 1/1/2024] thiamine, 100 mg, Oral, Daily      IV Meds:   sodium chloride, 25 mL/hr, Last Rate: 25 mL/hr (12/27/23 1122)        Results Reviewed:   I have personally reviewed the results from the time of this admission to 12/27/2023 13:03 EST     Lab Results   Component Value Date    GLUCOSE 122 (H) 12/27/2023    CALCIUM 8.8 12/27/2023     (C) 12/27/2023    K 3.7 12/27/2023    CO2 23.0 12/27/2023    CL 75 (L) 12/27/2023    BUN 8 12/27/2023    CREATININE 0.71 (L) 12/27/2023    BCR 11.3 12/27/2023    ANIONGAP 14.0 12/27/2023      Lab Results   Component Value Date    MG 1.8 12/27/2023    ALBUMIN 4.3 12/26/2023         Radiology noted    Assessment / Plan     ASSESSMENT:  Hyponatremia-with a low urine na and low urine osmolality, likely due to poor oral solute intake and high water intake the last few days (\"beer potomania syndrome\"); improving on 3% saline, will likely stop it later " today  Benign essential hypertension-inadequately controlled  Influenza a    PLAN:  3% saline with serial sodium concentration determinations  Add po amlodpine  Will need counselling regarding alcohol cessation  nutrition    Thank you for involving us in the care of Manoj Cleary.  Please feel free to call with any questions.    Matthew Morris MD  12/27/23  13:03 [facility]    [facility]  [phone]

## 2023-12-27 NOTE — TELEPHONE ENCOUNTER
Caller: [facility]        New or established patient?  [x] New  [] Established    Date of discharge: 01/01/2024    [facility]     Diagnosis/Symptoms: LOW SODIUM     Length of stay (If applicable): ADMITTED 12/27/2023

## 2023-12-27 NOTE — NURSING NOTE
PICC consulted due to need of PICC line for hypertonic solution. Jackeline BOWERS answered and said she would be there soon to do procedure. Obtained signed consent for PICC.

## 2023-12-27 NOTE — CONSULTS
Critical Care Consult Note   Manoj Cleary : 1965 MRN:[id number] LOS:0 ROOM: 15/15     Reason for admission: Hyponatremia     Assessment / Plan     Severe Hyponatremia  Nephrology following  Q2h neuro checks  3% NS ordered per Neph, labs per protocol  Urine sodium, TSH, Serum osmolality pending  Alcohol history documented as 20 drinks (beers)/week -- Last drink 23  Monitor Is/O    Influenza A  Supportive care -- Tessalon pearls, tylenol, Zofran for nausea     Essential hypertension -- Not on home medication  Patient given a one time dose of Norvasc 10 mg  Lopressor 25 mg BID    Elevated LFTs  Monitor and trend    Hypokalemia -- Replacing      Code Status (Patient has no pulse and is not breathing): CPR (Attempt to Resuscitate)  Medical Interventions (Patient has pulse or is breathing): Full Support       Nutrition: Diet: Fluid Restriction (240 mL/tray) Diets; 1500 mL/day; Fluid Consistency: Thin (IDDSI 0) Patient isn't on Tube Feeding     DVT prophylaxis:  Mechanical DVT prophylaxis orders are present.     History of Present illness     A 58 y.o. old male patient with PMH of hypertension, hyponatremia, presents to the hospital with complaints of increased fatigue, cough, and congestion for 5 days. He reports some left sided chest muscle pain after coughing yesterday. Patient also reported having an increase in mental fogginess and feeling light-headed the last couple of days.     In ER the patient's sodium was found 111 and potassium 3.1. Patient was also found to be influenz A positive. Nephrology was consulted and patient was started and a hypertonic saline drip. The patient's neuro status will be monitored in ICU.    ACP: CPR, Full Intervention. Patient spouse is his decision maker in the event that he is unable.     Patient was seen and examined on 23 at 08:01 EST .    Subjective / Review of systems     Review of Systems   Constitutional:  Positive for fatigue. Negative for chills and  fever.   Respiratory:  Positive for cough and shortness of breath. Negative for chest tightness.    Cardiovascular:  Negative for chest pain.   Gastrointestinal:  Negative for abdominal pain, nausea and vomiting.   Genitourinary:  Negative for difficulty urinating.   Musculoskeletal:  Positive for myalgias (Left chest). Negative for arthralgias.   Neurological:  Positive for dizziness and light-headedness. Negative for seizures.        Past Medical/Surgical/Social/Family History & Allergies     Past Medical History:   Diagnosis Date    Hypertension     Hyponatremia       Past Surgical History:   Procedure Laterality Date    HERNIA REPAIR        Social History     Socioeconomic History    Marital status:    Tobacco Use    Smoking status: Every Day     Types: Cigars     Passive exposure: Current    Smokeless tobacco: Never   Vaping Use    Vaping Use: Never used   Substance and Sexual Activity    Alcohol use: Yes     Alcohol/week: 20.0 standard drinks of alcohol     Types: 20 Cans of beer per week    Drug use: Never    Sexual activity: Defer      Family History   Problem Relation Age of Onset    Heart disease Father       No Known Allergies     Home Medications     Prior to Admission medications    Medication Sig Start Date End Date Taking? Authorizing Provider   aspirin 81 MG EC tablet Take 1 tablet by mouth Daily.   Yes Annie Winters MD   cetirizine (zyrTEC) 10 MG tablet Take 1 tablet by mouth Daily.   Yes Annie Winters MD   omeprazole (priLOSEC) 20 MG capsule Take 1 capsule by mouth Daily.   Yes Annie Winters MD        Objective / Physical Exam     Vital signs:  Temp: 98.4 °F (36.9 °C)  BP: 138/81  Heart Rate: 61  Resp: 14  SpO2: 97 %  Weight: 96.5 kg (212 lb 11.9 oz)    Admission Weight: Weight: 96.5 kg (212 lb 11.9 oz)    Physical Exam  Vitals and nursing note reviewed.   Constitutional:       General: He is not in acute distress.     Appearance: He is not ill-appearing.   HENT:       Head: Normocephalic.      Mouth/Throat:      Mouth: Mucous membranes are moist.      Pharynx: Oropharynx is clear.   Eyes:      Extraocular Movements: Extraocular movements intact.      Conjunctiva/sclera: Conjunctivae normal.      Pupils: Pupils are equal, round, and reactive to light.   Cardiovascular:      Rate and Rhythm: Normal rate and regular rhythm.      Pulses: Normal pulses.      Heart sounds: Normal heart sounds.   Pulmonary:      Effort: Pulmonary effort is normal.      Breath sounds: Normal breath sounds.   Abdominal:      General: Bowel sounds are normal.      Palpations: Abdomen is soft.   Musculoskeletal:      Right lower leg: No edema.      Left lower leg: No edema.   Skin:     General: Skin is warm and dry.      Findings: No rash.   Neurological:      Mental Status: He is alert and oriented to person, place, and time.   Psychiatric:         Mood and Affect: Mood normal.         Behavior: Behavior normal.          Labs     Results from last 7 days   Lab Units 12/26/23 2322 12/26/23 1820 12/26/23  1624   WBC 10*3/mm3 4.80  --  6.80   HEMATOCRIT % 41.7  --  47.4   HEMATOCRIT POC %  --  45  --    PLATELETS 10*3/mm3 177  --  222      Results from last 7 days   Lab Units 12/26/23  2322 12/26/23  1820 12/26/23  1707   SODIUM mmol/L 108*  --  111*   POTASSIUM mmol/L 3.6  --  3.1*   CHLORIDE mmol/L 69*  --  68*   CO2 mmol/L 25.0  --  28.0   BUN mg/dL 7  --  7   CREATININE mg/dL 0.58* 0.60 0.70*        Imaging     XR Chest 2 View    Result Date: 12/26/2023  Impression: Cardiomegaly. No acute cardiopulmonary abnormality. Electronically Signed: Madai Matta MD  12/26/2023 5:35 PM EST  Workstation ID: [id number]      Chest X ray: My independent assessment showed no infiltrates or effusions    EKG: My independent evaluation showed normal sinus rhythm, no ST -T changes    Current Medications     Scheduled Meds:  folic acid, 1 mg, Oral, Daily  metoprolol tartrate, 25 mg, Oral, Q12H  oxymetazoline, 2 spray, Each  Alvarado, BID  sodium chloride, 10 mL, Intravenous, Q12H  sodium chloride, 10 mL, Intravenous, Q12H  thiamine (B-1) IV, 200 mg, Intravenous, Q8H   Followed by  [START ON 1/1/2024] thiamine, 100 mg, Oral, Daily         Continuous Infusions:  sodium chloride, 25 mL/hr, Last Rate: 25 mL/hr (12/27/23 1122)         LAUREANO Turcios   Critical Care  12/27/23   08:01 EST

## 2023-12-27 NOTE — CASE MANAGEMENT/SOCIAL WORK
Discharge Planning Assessment  [facility]     Patient Name: Manoj Cleary  MRN: [id number]  Today's Date: 12/27/2023    Admit Date: 12/26/2023    Plan: Home with wife, new PCP appt on AVS   Discharge Needs Assessment       Row Name 12/27/23 0945       Living Environment    People in Home spouse    Current Living Arrangements home    Potentially Unsafe Housing Conditions none    Primary Care Provided by self    Provides Primary Care For no one    Family Caregiver if Needed none    Able to Return to Prior Arrangements yes       Resource/Environmental Concerns    Resource/Environmental Concerns none    Transportation Concerns none       Food Insecurity    Within the past 12 months, you worried that your food would run out before you got the money to buy more. Never true    Within the past 12 months, the food you bought just didn't last and you didn't have money to get more. Never true       Transition Planning    Patient/Family Anticipates Transition to home with family    Patient/Family Anticipated Services at Transition none    Transportation Anticipated car, drives self;family or friend will provide       Discharge Needs Assessment    Readmission Within the Last 30 Days no previous admission in last 30 days    Equipment Currently Used at Home none    Concerns to be Addressed denies needs/concerns at this time    Anticipated Changes Related to Illness none    Equipment Needed After Discharge none                   Discharge Plan       Row Name 12/27/23 0945       Plan    Plan Home with wife, new PCP appt on AVS    Plan Comments Met with Patient at bedside Lives at home with wife. IADL's Needed PCP and patient agrrable, Schedule appt information given to patient and added to AVS. No transportation or finanical concerns. DC barriers: Critical Sodium 108 being admitted to ICU                  Continued Care and Services - Admitted Since 12/26/2023    Coordination has not been started for this encounter.       Expected  Discharge Date and Time       Expected Discharge Date Expected Discharge Time    Dec 30, 2023            Demographic Summary       Row Name 12/27/23 0944       General Information    Admission Type inpatient    Arrived From emergency department    Referral Source admission list    Reason for Consult discharge planning    Preferred Language English                   Functional Status       Row Name 12/27/23 0944       Functional Status    Usual Activity Tolerance good    Current Activity Tolerance good       Functional Status, IADL    Medications independent    Meal Preparation independent    Housekeeping independent    Laundry independent    Shopping independent       Mental Status    General Appearance WDL WDL       Mental Status Summary    Recent Changes in Mental Status/Cognitive Functioning no changes                          Alejandra Santiago, RN

## 2023-12-27 NOTE — H&P
[facility] Medicine Services  History & Physical    Patient Name: Manoj Cleary  : 1965  MRN: [id number]  Primary Care Physician:  Provider, No Known  Date of admission: 2023  Date and Time of Service: 2023 at 2000    Subjective      Chief Complaint: cough, congestion     History of Present Illness: Manoj Cleary is a very pleasant 58 y.o. male with a CMH of hypertension and obesity  who presented to [facility] on 2023 with  flu -like symptoms since last Thursday.  He reports nasal congestion and paroxysmal coughing spells with rib pain secondary to the coughing spells.  He denies any shortness of breath, nausea vomiting diarrhea.  He is currently afebrile.  His wife at bedside reports she went to urgent care and tested for flu a as well as their son.  Patient reports he declined to be tested.  He does however test positive for influenza A today.  He is stable on room air.  Blood pressure was elevated on admission.  He reports he does not see a primary care provider and had high blood pressure in the past on metoprolol however he has not been taking any medications for quite a while.  He may likely have been discharged however his labs came back with a critical sodium of 108.  He reports both his son and he have had low sodium in the past although they do not know how low.  He does report he drinks at least 2 beers a day sometimes more.  Other labs show a potassium of 3.1 chloride of 73 0.60 BUN 6 glucose 143 ionized calcium 0.95,  ALT 95.  WBC and hemoglobin are normal.  Chest x-ray per radiology shows no acute abnormality.  EKG with no others for comparison shows sinus rhythm heart rate 80 prolonged KS interval incomplete left bundle branch block.  He was started on normal saline 125 cc/h and potassium replacement protocol will be put in place.  Supportive care for influenza A.  Nephrology has been consulted for hyponatremia.      Review of  Systems    Personal History     Past Medical History:   Diagnosis Date    Hypertension     Hyponatremia        Past Surgical History:   Procedure Laterality Date    HERNIA REPAIR         Family History: family history includes Heart disease in his father. Otherwise pertinent FHx was reviewed and not pertinent to current issue.    Social History:  reports that he has been smoking cigarettes and cigars. He does not have any smokeless tobacco history on file. He reports current alcohol use of about 20.0 standard drinks of alcohol per week. He reports that he does not use drugs.    Home Medications:  Prior to Admission Medications       None              Allergies:  No Known Allergies    Objective      Vitals:   Temp:  [98.3 °F (36.8 °C)] 98.3 °F (36.8 °C)  Heart Rate:  [81-85] 81  Resp:  [16] 16  BP: (172-198)/(101-124) 172/104  Body mass index is 31.42 kg/m².  Physical Exam    Diagnostic Data:  Lab Results (last 24 hours)       Procedure Component Value Units Date/Time    POC CHEM 8 [[id number]]  (Abnormal) Collected: 12/26/23 1820    Specimen: Venous Blood Updated: 12/26/23 1823     Glucose 143 mg/dL      BUN 6 mg/dL      Creatinine 0.60 mg/dL      Sodium 108 mmol/L      POC Potassium 3.1 mmol/L      Chloride 73 mmol/L      Total CO2 25 mmol/L      Anion Gap 14.0 mmol/L      Comment: Serial Number: [id number]:  [id number]        Hemoglobin 15.3 g/dL      Hematocrit 45 %      Ionized Calcium 0.95 mmol/L      eGFR 111.2 mL/min/1.73     Comprehensive Metabolic Panel [[id number]]  (Abnormal) Collected: 12/26/23 1707    Specimen: Blood Updated: 12/26/23 1806     Glucose 134 mg/dL      BUN 7 mg/dL      Creatinine 0.70 mg/dL      Sodium 111 mmol/L      Potassium 3.1 mmol/L      Chloride 68 mmol/L      CO2 28.0 mmol/L      Calcium 8.9 mg/dL      Total Protein 7.3 g/dL      Albumin 4.3 g/dL      ALT (SGPT) 95 U/L      AST (SGOT) 112 U/L      Alkaline Phosphatase 123 U/L      Total Bilirubin 0.6 mg/dL      Globulin 3.0 gm/dL       A/G Ratio 1.4 g/dL      BUN/Creatinine Ratio 10.0     Anion Gap 15.0 mmol/L      eGFR 106.1 mL/min/1.73     Narrative:      GFR Normal >60  Chronic Kidney Disease <60  Kidney Failure <15      Single High Sensitivity Troponin T [[id number]]  (Normal) Collected: 12/26/23 1707    Specimen: Blood Updated: 12/26/23 1755     HS Troponin T 9 ng/L     Narrative:      High Sensitive Troponin T Reference Range:  <14.0 ng/L- Negative Female for AMI  <22.0 ng/L- Negative Male for AMI  >=14 - Abnormal Female indicating possible myocardial injury.  >=22 - Abnormal Male indicating possible myocardial injury.   Clinicians would have to utilize clinical acumen, EKG, Troponin, and serial changes to determine if it is an Acute Myocardial Infarction or myocardial injury due to an underlying chronic condition.         Respiratory Panel PCR w/COVID-19(SARS-CoV-2) FABY/BRIAN/KARLIE/PAD/COR/KYLAH In-House, NP Swab in UTM/VTM, 2 HR TAT - Swab, Nasopharynx [[id number]]  (Abnormal) Collected: 12/26/23 1624    Specimen: Swab from Nasopharynx Updated: 12/26/23 1738     ADENOVIRUS, PCR Not Detected     Coronavirus 229E Not Detected     Coronavirus HKU1 Not Detected     Coronavirus NL63 Not Detected     Coronavirus OC43 Not Detected     COVID19 Not Detected     Human Metapneumovirus Not Detected     Human Rhinovirus/Enterovirus Not Detected     Influenza A H1 2009 PCR Detected     Influenza B PCR Not Detected     Parainfluenza Virus 1 Not Detected     Parainfluenza Virus 2 Not Detected     Parainfluenza Virus 3 Not Detected     Parainfluenza Virus 4 Not Detected     RSV, PCR Not Detected     Bordetella pertussis pcr Not Detected     Bordetella parapertussis PCR Not Detected     Chlamydophila pneumoniae PCR Not Detected     Mycoplasma pneumo by PCR Not Detected    Narrative:      In the setting of a positive respiratory panel with a viral infection PLUS a negative procalcitonin without other underlying concern for bacterial infection, consider observing  off antibiotics or discontinuation of antibiotics and continue supportive care. If the respiratory panel is positive for atypical bacterial infection (Bordetella pertussis, Chlamydophila pneumoniae, or Mycoplasma pneumoniae), consider antibiotic de-escalation to target atypical bacterial infection.    BNP [[id number]]  (Normal) Collected: 12/26/23 1624    Specimen: Blood Updated: 12/26/23 1654     proBNP 105.7 pg/mL     Narrative:      This assay is used as an aid in the diagnosis of individuals suspected of having heart failure. It can be used as an aid in the diagnosis of acute decompensated heart failure (ADHF) in patients presenting with signs and symptoms of ADHF to the emergency department (ED). In addition, NT-proBNP of <300 pg/mL indicates ADHF is not likely.    Age Range Result Interpretation  NT-proBNP Concentration (pg/mL:      <50             Positive            >450                   Gray                 300-450                    Negative             <300    50-75           Positive            >900                  Gray                300-900                  Negative            <300      >75             Positive            >1800                  Gray                300-1800                  Negative            <300    CBC & Differential [[id number]]  (Abnormal) Collected: 12/26/23 1624    Specimen: Blood Updated: 12/26/23 1651    Narrative:      The following orders were created for panel order CBC & Differential.  Procedure                               Abnormality         Status                     ---------                               -----------         ------                     CBC Auto Differential[[id number]]        Abnormal            Final result                 Please view results for these tests on the individual orders.    CBC Auto Differential [[id number]]  (Abnormal) Collected: 12/26/23 1624    Specimen: Blood Updated: 12/26/23 1651     WBC 6.80 10*3/mm3      RBC 5.00 10*6/mm3       Hemoglobin 17.1 g/dL      Hematocrit 47.4 %      MCV 94.8 fL      MCH 34.2 pg      MCHC 36.1 g/dL      RDW 12.6 %      RDW-SD 42.0 fl      MPV 8.4 fL      Platelets 222 10*3/mm3      Neutrophil % 86.8 %      Lymphocyte % 6.8 %      Monocyte % 6.1 %      Eosinophil % 0.1 %      Basophil % 0.2 %      Neutrophils, Absolute 5.90 10*3/mm3      Lymphocytes, Absolute 0.50 10*3/mm3      Monocytes, Absolute 0.40 10*3/mm3      Eosinophils, Absolute 0.00 10*3/mm3      Basophils, Absolute 0.00 10*3/mm3      nRBC 0.2 /100 WBC              Imaging Results (Last 24 Hours)       Procedure Component Value Units Date/Time    XR Chest 2 View [[id number]] Collected: 12/26/23 1734     Updated: 12/26/23 1737    Narrative:      XR CHEST 2 VW    Date of Exam: 12/26/2023 5:24 PM EST    Indication: CHF/COPD Protocol  CHF/COPD Protocol    Comparison: None available.    Findings:  Lungs normal expanded. Mild cardiomegaly. Tortuous ectatic thoracic aorta. No pneumothorax or pleural effusion or focal pulmonary parenchymal opacity. Pulmonary vessels are distinct. Mild spondylosis thoracic spine.      Impression:      Impression:  Cardiomegaly. No acute cardiopulmonary abnormality.      Electronically Signed: Madai Matta MD    12/26/2023 5:35 PM EST    Workstation ID: [id number]              Assessment & Plan        This is a 58 y.o. male with:    Active and Resolved Problems  Active Hospital Problems    Diagnosis  POA    **Hyponatremia [E87.1]  Yes     Priority: High    Hypokalemia [E87.6]  Yes     Priority: Medium    Influenza A [J10.1]  Yes     Priority: Medium    Essential hypertension [I10]  Yes     Priority: Medium    Elevated LFTs [R79.89]  Yes    Obesity (BMI 30-39.9) [E66.9]  Yes      Resolved Hospital Problems   No resolved problems to display.     Severe hyponatremia sodium 108, seizure precautions in place acute on chronic?,  Normal saline at 125 cc/h, repeat BMP in a.m. nephrology consulted 1500 cc fluid restriction, consider beer Poto  jeanmarie    Hypokalemia potassium 3.1 potassium protocol in place repeat BMP in a.m.    Influenza A, supportive care, Tessalon Perles, acetaminophen for pain, albuterol INH as needed, too late for Tamiflu, one-time Norco 5/325 for pleuritic pain due to coughing, one-time dose 5 mL Hycodan at night for coughing    Essential hypertension, elevated on admission, has not had home meds in quite a while, one-time dose 10 mg labetalol, reordered home 2.5 mg metoprolol tartrate twice daily monitor BP    Elevated LFTs, likely secondary to alcohol consumption encourage cessation no abdominal complaints    Obesity BMI 31.42 lifestyle management education    DVT prophylaxis:  Mechanical DVT prophylaxis orders are present.    The patient desires to be as follows:    CODE STATUS:    Code Status (Patient has no pulse and is not breathing): CPR (Attempt to Resuscitate)  Medical Interventions (Patient has pulse or is breathing): Full Support        Admission Status:  I believe this patient meets observation status.    Expected Length of Stay: 1-2 days     PDMP and Medication Dispenses via Sidebar reviewed and consistent with patient reported medications.    I discussed the patient's findings and my recommendations with patient and family.      Signature:     This document has been electronically signed by LAUREANO Lane on December 26, 2023 20:52 PENNY   [facility]ist Team

## 2023-12-27 NOTE — PLAN OF CARE
Goal Outcome Evaluation:  Plan of Care Reviewed With: patient        Progress: no change  Outcome Evaluation: pt continues to have a cough, pt complains of rib pain when coughing, pt able to walk around room without difficulty, pt appeared to rest some this shift, pt required 2 L o2 when oxygen saturation decreased, pt displays no distress at this time, pt potassium 3.6, provider called, no new orders to begin protocol for replacement at this time

## 2023-12-27 NOTE — PAYOR COMM NOTE
"    INPATIENT HOSPITAL MEDICAL AUTH REQUEST    Please advise if additional clinical is needed to process this request.  Thank you!    Jennifer Franco  Utilization Review Coordinator  [address]  [address]  Ph: [phone]  Fx: [phone]          Harsha Cleary (58 y.o. Male)       Date of Birth   1965    Social Security Number       Address   [address] [place] [address] [place] [facility]    Home Phone   [phone]    MRN   [id number]       [place]   None    Marital Status                               Admission Date   12/26/23    Admission Type   Emergency    Admitting Provider   Uche Ho DO    Attending Provider   Uche Ho DO    Department, Room/Bed   [facility] CARDIOVASCULAR CARE UNIT, 2204/1       Discharge Date       Discharge Disposition       Discharge Destination                                 Attending Provider: Uche Ho DO    Allergies: No Known Allergies    Isolation: Droplet   Infection: Influenza (12/26/23)   Code Status: CPR    Ht: 175.3 cm (69\")   Wt: 96.4 kg (212 lb 8.4 oz)    Admission Cmt: None   Principal Problem: Hyponatremia [E87.1]                   Active Insurance as of 12/26/2023       Primary Coverage       Payor Plan Insurance Group Employer/Plan Group    [facility]        Payor Plan Address Payor Plan Phone Number Payor Plan Fax Number Effective Dates    [address] [phone]  1/1/2023 - None Entered    [facility] [id number]         Subscriber Name Subscriber Birth Date Member ID       ARUNHARSHA HORNE 1965 [id number]                     Emergency Contacts        (Rel.) Home Phone Work Phone Mobile Phone    Susi Cleary (Spouse) -- -- [phone]                 History & Physical        Essing-Barb Barillas APRN at 12/26/23 1927       Attestation signed by Dayday Mansfield MD at 12/27/23 0611    I have reviewed this documentation and " agree.                      [facility]  History & Physical    Patient Name: Manoj Cleary  : 1965  MRN: [id number]  Primary Care Physician:  Provider, No Known  Date of admission: 2023  Date and Time of Service: 2023 at 2000    Subjective      Chief Complaint: cough, congestion     History of Present Illness: Manoj Cleary is a very pleasant 58 y.o. male with a CMH of hypertension and obesity  who presented to [facility] on 2023 with  flu -like symptoms since last Thursday.  He reports nasal congestion and paroxysmal coughing spells with rib pain secondary to the coughing spells.  He denies any shortness of breath, nausea vomiting diarrhea.  He is currently afebrile.  His wife at bedside reports she went to urgent care and tested for flu a as well as their son.  Patient reports he declined to be tested.  He does however test positive for influenza A today.  He is stable on room air.  Blood pressure was elevated on admission.  He reports he does not see a primary care provider and had high blood pressure in the past on metoprolol however he has not been taking any medications for quite a while.  He may likely have been discharged however his labs came back with a critical sodium of 108.  He reports both his son and he have had low sodium in the past although they do not know how low.  He does report he drinks at least 2 beers a day sometimes more.  Other labs show a potassium of 3.1 chloride of 73 0.60 BUN 6 glucose 143 ionized calcium 0.95,  ALT 95.  WBC and hemoglobin are normal.  Chest x-ray per radiology shows no acute abnormality.  EKG with no others for comparison shows sinus rhythm heart rate 80 prolonged IA interval incomplete left bundle branch block.  He was started on normal saline 125 cc/h and potassium replacement protocol will be put in place.  Supportive care for influenza A.  Nephrology has been consulted for  hyponatremia.      Review of Systems    Personal History     Past Medical History:   Diagnosis Date    Hypertension     Hyponatremia        Past Surgical History:   Procedure Laterality Date    HERNIA REPAIR         Family History: family history includes Heart disease in his father. Otherwise pertinent FHx was reviewed and not pertinent to current issue.    Social History:  reports that he has been smoking cigarettes and cigars. He does not have any smokeless tobacco history on file. He reports current alcohol use of about 20.0 standard drinks of alcohol per week. He reports that he does not use drugs.    Home Medications:  Prior to Admission Medications       None              Allergies:  No Known Allergies    Objective      Vitals:   Temp:  [98.3 °F (36.8 °C)] 98.3 °F (36.8 °C)  Heart Rate:  [81-85] 81  Resp:  [16] 16  BP: (172-198)/(101-124) 172/104  Body mass index is 31.42 kg/m².  Physical Exam    Diagnostic Data:  Lab Results (last 24 hours)       Procedure Component Value Units Date/Time    POC CHEM 8 [[id number]]  (Abnormal) Collected: 12/26/23 1820    Specimen: Venous Blood Updated: 12/26/23 1823     Glucose 143 mg/dL      BUN 6 mg/dL      Creatinine 0.60 mg/dL      Sodium 108 mmol/L      POC Potassium 3.1 mmol/L      Chloride 73 mmol/L      Total CO2 25 mmol/L      Anion Gap 14.0 mmol/L      Comment: Serial Number: [id number]:  [id number]        Hemoglobin 15.3 g/dL      Hematocrit 45 %      Ionized Calcium 0.95 mmol/L      eGFR 111.2 mL/min/1.73     Comprehensive Metabolic Panel [[id number]]  (Abnormal) Collected: 12/26/23 1707    Specimen: Blood Updated: 12/26/23 1806     Glucose 134 mg/dL      BUN 7 mg/dL      Creatinine 0.70 mg/dL      Sodium 111 mmol/L      Potassium 3.1 mmol/L      Chloride 68 mmol/L      CO2 28.0 mmol/L      Calcium 8.9 mg/dL      Total Protein 7.3 g/dL      Albumin 4.3 g/dL      ALT (SGPT) 95 U/L      AST (SGOT) 112 U/L      Alkaline Phosphatase 123 U/L      Total Bilirubin 0.6  mg/dL      Globulin 3.0 gm/dL      A/G Ratio 1.4 g/dL      BUN/Creatinine Ratio 10.0     Anion Gap 15.0 mmol/L      eGFR 106.1 mL/min/1.73     Narrative:      GFR Normal >60  Chronic Kidney Disease <60  Kidney Failure <15      Single High Sensitivity Troponin T [[id number]]  (Normal) Collected: 12/26/23 1707    Specimen: Blood Updated: 12/26/23 1755     HS Troponin T 9 ng/L     Narrative:      High Sensitive Troponin T Reference Range:  <14.0 ng/L- Negative Female for AMI  <22.0 ng/L- Negative Male for AMI  >=14 - Abnormal Female indicating possible myocardial injury.  >=22 - Abnormal Male indicating possible myocardial injury.   Clinicians would have to utilize clinical acumen, EKG, Troponin, and serial changes to determine if it is an Acute Myocardial Infarction or myocardial injury due to an underlying chronic condition.         Respiratory Panel PCR w/COVID-19(SARS-CoV-2) FABY/BRIAN/KARLIE/PAD/COR/KYLAH In-House, NP Swab in UTM/[facility] 2 HR TAT - Swab, Nasopharynx [[id number]]  (Abnormal) Collected: 12/26/23 1624    Specimen: Swab from Nasopharynx Updated: 12/26/23 1738     ADENOVIRUS, PCR Not Detected     Coronavirus 229E Not Detected     Coronavirus HKU1 Not Detected     Coronavirus NL63 Not Detected     Coronavirus OC43 Not Detected     COVID19 Not Detected     Human Metapneumovirus Not Detected     Human Rhinovirus/Enterovirus Not Detected     Influenza A H1 2009 PCR Detected     Influenza B PCR Not Detected     Parainfluenza Virus 1 Not Detected     Parainfluenza Virus 2 Not Detected     Parainfluenza Virus 3 Not Detected     Parainfluenza Virus 4 Not Detected     RSV, PCR Not Detected     Bordetella pertussis pcr Not Detected     Bordetella parapertussis PCR Not Detected     Chlamydophila pneumoniae PCR Not Detected     Mycoplasma pneumo by PCR Not Detected    Narrative:      In the setting of a positive respiratory panel with a viral infection PLUS a negative procalcitonin without other underlying concern for  bacterial infection, consider observing off antibiotics or discontinuation of antibiotics and continue supportive care. If the respiratory panel is positive for atypical bacterial infection (Bordetella pertussis, Chlamydophila pneumoniae, or Mycoplasma pneumoniae), consider antibiotic de-escalation to target atypical bacterial infection.    BNP [[id number]]  (Normal) Collected: 12/26/23 1624    Specimen: Blood Updated: 12/26/23 1654     proBNP 105.7 pg/mL     Narrative:      This assay is used as an aid in the diagnosis of individuals suspected of having heart failure. It can be used as an aid in the diagnosis of acute decompensated heart failure (ADHF) in patients presenting with signs and symptoms of ADHF to the emergency department (ED). In addition, NT-proBNP of <300 pg/mL indicates ADHF is not likely.    Age Range Result Interpretation  NT-proBNP Concentration (pg/mL:      <50             Positive            >450                   Gray                 300-450                    Negative             <300    50-75           Positive            >900                  Gray                300-900                  Negative            <300      >75             Positive            >1800                  Gray                300-1800                  Negative            <300    CBC & Differential [[id number]]  (Abnormal) Collected: 12/26/23 1624    Specimen: Blood Updated: 12/26/23 1651    Narrative:      The following orders were created for panel order CBC & Differential.  Procedure                               Abnormality         Status                     ---------                               -----------         ------                     CBC Auto Differential[[id number]]        Abnormal            Final result                 Please view results for these tests on the individual orders.    CBC Auto Differential [[id number]]  (Abnormal) Collected: 12/26/23 1624    Specimen: Blood Updated: 12/26/23 1651     WBC 6.80  10*3/mm3      RBC 5.00 10*6/mm3      Hemoglobin 17.1 g/dL      Hematocrit 47.4 %      MCV 94.8 fL      MCH 34.2 pg      MCHC 36.1 g/dL      RDW 12.6 %      RDW-SD 42.0 fl      MPV 8.4 fL      Platelets 222 10*3/mm3      Neutrophil % 86.8 %      Lymphocyte % 6.8 %      Monocyte % 6.1 %      Eosinophil % 0.1 %      Basophil % 0.2 %      Neutrophils, Absolute 5.90 10*3/mm3      Lymphocytes, Absolute 0.50 10*3/mm3      Monocytes, Absolute 0.40 10*3/mm3      Eosinophils, Absolute 0.00 10*3/mm3      Basophils, Absolute 0.00 10*3/mm3      nRBC 0.2 /100 WBC              Imaging Results (Last 24 Hours)       Procedure Component Value Units Date/Time    XR Chest 2 View [[id number]] Collected: 12/26/23 1734     Updated: 12/26/23 1737    Narrative:      XR CHEST 2 VW    Date of Exam: 12/26/2023 5:24 PM EST    Indication: CHF/COPD Protocol  CHF/COPD Protocol    Comparison: None available.    Findings:  Lungs normal expanded. Mild cardiomegaly. Tortuous ectatic thoracic aorta. No pneumothorax or pleural effusion or focal pulmonary parenchymal opacity. Pulmonary vessels are distinct. Mild spondylosis thoracic spine.      Impression:      Impression:  Cardiomegaly. No acute cardiopulmonary abnormality.      Electronically Signed: Madai Matta MD    12/26/2023 5:35 PM EST    Workstation ID: [id number]              Assessment & Plan        This is a 58 y.o. male with:    Active and Resolved Problems  Active Hospital Problems    Diagnosis  POA    **Hyponatremia [E87.1]  Yes     Priority: High    Hypokalemia [E87.6]  Yes     Priority: Medium    Influenza A [J10.1]  Yes     Priority: Medium    Essential hypertension [I10]  Yes     Priority: Medium    Elevated LFTs [R79.89]  Yes    Obesity (BMI 30-39.9) [E66.9]  Yes      Resolved Hospital Problems   No resolved problems to display.     Severe hyponatremia sodium 108, seizure precautions in place acute on chronic?,  Normal saline at 125 cc/h, repeat BMP in a.m. nephrology consulted 1500 cc  fluid restriction, consider beer Poto jeanmarie    Hypokalemia potassium 3.1 potassium protocol in place repeat BMP in a.m.    Influenza A, supportive care, Tessalon Perles, acetaminophen for pain, albuterol INH as needed, too late for Tamiflu, one-time Norco 5/325 for pleuritic pain due to coughing, one-time dose 5 mL Hycodan at night for coughing    Essential hypertension, elevated on admission, has not had home meds in quite a while, one-time dose 10 mg labetalol, reordered home 2.5 mg metoprolol tartrate twice daily monitor BP    Elevated LFTs, likely secondary to alcohol consumption encourage cessation no abdominal complaints    Obesity BMI 31.42 lifestyle management education    DVT prophylaxis:  Mechanical DVT prophylaxis orders are present.    The patient desires to be as follows:    CODE STATUS:    Code Status (Patient has no pulse and is not breathing): CPR (Attempt to Resuscitate)  Medical Interventions (Patient has pulse or is breathing): Full Support        Admission Status:  I believe this patient meets observation status.    Expected Length of Stay: 1-2 days     PDMP and Medication Dispenses via Sidebar reviewed and consistent with patient reported medications.    I discussed the patient's findings and my recommendations with patient and family.      Signature:     This document has been electronically signed by LAUREANO Lane on December 26, 2023 20:52 [facility] Hospitalist Team    Electronically signed by Dayday Mansfield MD at 12/27/23 0611          Emergency Department Notes        Hao Rangel MD at 12/26/23 1543          Subjective   Provider in Triage Note  Patient is a 58-year-old male PMH significant for hypertension presenting to the ED with complaints of cough congestion for the past 5 days.  He reports he has had increased dyspnea and pleuritic type chest pain today.  He states his chest pain is worse with coughing spells.  He denies any unilateral leg swelling.  Patient's  wife in triage also reports she has been sick with similar symptoms.  They report subjective fever no chills.  No edema.    Due to significant overcrowding in the emergency department patient was initially seen and evaluated in triage.  Provider in triage recommended patient placed in treatment area to initiate therapy and movement to an ER bed as soon as possible.        History of Present Illness  I interviewed the patient for HPI and agree with the nurse practitioner providing triage note as noted above  Review of Systems    No past medical history on file.    No Known Allergies    No past surgical history on file.    No family history on file.    Social History     Socioeconomic History    Marital status:            Objective   Physical Exam  Neck has no adenopathy JVD or bruits.  Lungs are clear.  Heart has regular rate rhythm without murmur rub or gallop.  Chest is nontender.  Abdomen is soft nontender.  Back no tenderness.  Extremities M is no cyanosis or edema.  Procedures  My EKG interpretation shows normal sinus rhythm at a rate of 80 with no acute ST change        ED Course      Results for orders placed or performed during the hospital encounter of 12/26/23   Respiratory Panel PCR w/COVID-19(SARS-CoV-2) FABY/BRIAN/KARLIE/PAD/COR/KYLAH In-House, NP Swab in UTM/VTM, 2 HR TAT - Swab, Nasopharynx    Specimen: Nasopharynx; Swab   Result Value Ref Range    ADENOVIRUS, PCR Not Detected Not Detected    Coronavirus 229E Not Detected Not Detected    Coronavirus HKU1 Not Detected Not Detected    Coronavirus NL63 Not Detected Not Detected    Coronavirus OC43 Not Detected Not Detected    COVID19 Not Detected Not Detected - Ref. Range    Human Metapneumovirus Not Detected Not Detected    Human Rhinovirus/Enterovirus Not Detected Not Detected    Influenza A H1 2009 PCR Detected (A) Not Detected    Influenza B PCR Not Detected Not Detected    Parainfluenza Virus 1 Not Detected Not Detected    Parainfluenza Virus 2 Not  Detected Not Detected    Parainfluenza Virus 3 Not Detected Not Detected    Parainfluenza Virus 4 Not Detected Not Detected    RSV, PCR Not Detected Not Detected    Bordetella pertussis pcr Not Detected Not Detected    Bordetella parapertussis PCR Not Detected Not Detected    Chlamydophila pneumoniae PCR Not Detected Not Detected    Mycoplasma pneumo by PCR Not Detected Not Detected   Comprehensive Metabolic Panel    Specimen: Blood   Result Value Ref Range    Glucose 134 (H) 65 - 99 mg/dL    BUN 7 6 - 20 mg/dL    Creatinine 0.70 (L) 0.76 - 1.27 mg/dL    Sodium 111 (C) 136 - 145 mmol/L    Potassium 3.1 (L) 3.5 - 5.2 mmol/L    Chloride 68 (L) 98 - 107 mmol/L    CO2 28.0 22.0 - 29.0 mmol/L    Calcium 8.9 8.6 - 10.5 mg/dL    Total Protein 7.3 6.0 - 8.5 g/dL    Albumin 4.3 3.5 - 5.2 g/dL    ALT (SGPT) 95 (H) 1 - 41 U/L    AST (SGOT) 112 (H) 1 - 40 U/L    Alkaline Phosphatase 123 (H) 39 - 117 U/L    Total Bilirubin 0.6 0.0 - 1.2 mg/dL    Globulin 3.0 gm/dL    A/G Ratio 1.4 g/dL    BUN/Creatinine Ratio 10.0 7.0 - 25.0    Anion Gap 15.0 5.0 - 15.0 mmol/L    eGFR 106.1 >60.0 mL/min/1.73   BNP    Specimen: Blood   Result Value Ref Range    proBNP 105.7 0.0 - 900.0 pg/mL   Single High Sensitivity Troponin T    Specimen: Blood   Result Value Ref Range    HS Troponin T 9 <22 ng/L   CBC Auto Differential    Specimen: Blood   Result Value Ref Range    WBC 6.80 3.40 - 10.80 10*3/mm3    RBC 5.00 4.14 - 5.80 10*6/mm3    Hemoglobin 17.1 13.0 - 17.7 g/dL    Hematocrit 47.4 37.5 - 51.0 %    MCV 94.8 79.0 - 97.0 fL    MCH 34.2 (H) 26.6 - 33.0 pg    MCHC 36.1 (H) 31.5 - 35.7 g/dL    RDW 12.6 12.3 - 15.4 %    RDW-SD 42.0 37.0 - 54.0 fl    MPV 8.4 6.0 - 12.0 fL    Platelets 222 140 - 450 10*3/mm3    Neutrophil % 86.8 (H) 42.7 - 76.0 %    Lymphocyte % 6.8 (L) 19.6 - 45.3 %    Monocyte % 6.1 5.0 - 12.0 %    Eosinophil % 0.1 (L) 0.3 - 6.2 %    Basophil % 0.2 0.0 - 1.5 %    Neutrophils, Absolute 5.90 1.70 - 7.00 10*3/mm3    Lymphocytes,  Absolute 0.50 (L) 0.70 - 3.10 10*3/mm3    Monocytes, Absolute 0.40 0.10 - 0.90 10*3/mm3    Eosinophils, Absolute 0.00 0.00 - 0.40 10*3/mm3    Basophils, Absolute 0.00 0.00 - 0.20 10*3/mm3    nRBC 0.2 0.0 - 0.2 /100 WBC   POC CHEM 8    Specimen: Venous Blood   Result Value Ref Range    Glucose 143 (H) 70 - 105 mg/dL    BUN 6 (L) 8 - 26 mg/dL    Creatinine 0.60 0.60 - 1.30 mg/dL    Sodium 108 (L) 138 - 146 mmol/L    POC Potassium 3.1 (L) 3.5 - 4.9 mmol/L    Chloride 73 (L) 98 - 109 mmol/L    Total CO2 25 24 - 29 mmol/L    Anion Gap 14.0 10.0 - 20.0 mmol/L    Hemoglobin 15.3 12.0 - 17.0 g/dL    Hematocrit 45 38 - 51 %    Ionized Calcium 0.95 (L) 1.12 - 1.32 mmol/L    eGFR 111.2 >60.0 mL/min/1.73   ECG 12 Lead Chest Pain   Result Value Ref Range    QT Interval 405 ms    QTC Interval 467 ms     XR Chest 2 View    Result Date: 12/26/2023  Impression: Cardiomegaly. No acute cardiopulmonary abnormality. Electronically Signed: Madai Matta MD  12/26/2023 5:35 PM EST  Workstation ID: [id number]                                            Medical Decision Making  Chest x-ray interpretation with cardiomegaly without effusion or infiltrate.  Metabolic panel shows sodium at 111 with potassium 3.1.  There is no renal insufficiency.  Patient has no leukocytosis no left shift and no anemia.  BNP was 500.  Troponin was normal.  Viral panel is positive for influenza A.  Patient has had longstanding hypertension that he has not taken medication for.  Patient will be given Norvasc p.o.  Will be admitted for influenza A hypertension and severe hyponatremia.  Patient will be started on IV sodium replacement.  I did speak to the on-call hospitalist.    Amount and/or Complexity of Data Reviewed  Labs: ordered. Decision-making details documented in ED Course.  Radiology: ordered and independent interpretation performed.  ECG/medicine tests: ordered and independent interpretation performed.    Risk  Prescription drug management.  Decision  regarding hospitalization.        Final diagnoses:   Influenza A   Hyponatremia   Secondary hypertension       ED Disposition  ED Disposition       ED Disposition   Decision to Admit    Condition   --    Comment   --               No follow-up provider specified.       Medication List      No changes were made to your prescriptions during this visit.            Hao Rangel MD  12/26/23 1841      Electronically signed by aHo Rangel MD at 12/26/23 1841       Vital Signs (last day)       Date/Time Temp Temp src Pulse Resp BP Patient Position SpO2    12/27/23 1124 98.3 (36.8) Oral 64 16 157/93 Lying 98    12/27/23 0744 98.4 (36.9) Oral -- 14 138/81 Lying --    12/27/23 0725 -- -- -- -- -- -- 97    12/27/23 0701 -- -- 61 -- 131/58 -- --    12/26/23 1930 -- -- 81 -- 172/104 -- --    12/26/23 1815 -- -- 83 -- 172/101 -- --    12/26/23 1730 -- -- 81 -- 173/109 -- --    12/26/23 1706 -- -- 83 -- 183/109 -- --    12/26/23 1541 98.3 (36.8) Oral 85 16 198/124 Sitting 98          Lab Results (last 24 hours)       Procedure Component Value Units Date/Time    Osmolality, Serum [[id number]]  (Abnormal) Collected: 12/27/23 1015    Specimen: Blood Updated: 12/27/23 1109     Osmolality 235 mOsm/kg     Basic Metabolic Panel [[id number]]  (Abnormal) Collected: 12/27/23 1015    Specimen: Blood Updated: 12/27/23 1059     Glucose 122 mg/dL      BUN 8 mg/dL      Creatinine 0.71 mg/dL      Sodium 112 mmol/L      Potassium 3.7 mmol/L      Chloride 75 mmol/L      CO2 23.0 mmol/L      Calcium 8.8 mg/dL      BUN/Creatinine Ratio 11.3     Anion Gap 14.0 mmol/L      eGFR 105.7 mL/min/1.73     Narrative:      GFR Normal >60  Chronic Kidney Disease <60  Kidney Failure <15      Magnesium [[id number]]  (Normal) Collected: 12/27/23 1015    Specimen: Blood Updated: 12/27/23 1059     Magnesium 1.8 mg/dL     Osmolality, Urine - Urine, Clean Catch [[id number]]  (Abnormal) Collected: 12/27/23 1006    Specimen: Urine, Clean Catch Updated: 12/27/23 1050      Osmolality, Urine 230 mOsm/kg     BNP [[id number]]  (Normal) Collected: 12/27/23 1015    Specimen: Blood Updated: 12/27/23 1051     proBNP 163.1 pg/mL     Narrative:      This assay is used as an aid in the diagnosis of individuals suspected of having heart failure. It can be used as an aid in the diagnosis of acute decompensated heart failure (ADHF) in patients presenting with signs and symptoms of ADHF to the emergency department (ED). In addition, NT-proBNP of <300 pg/mL indicates ADHF is not likely.    Age Range Result Interpretation  NT-proBNP Concentration (pg/mL:      <50             Positive            >450                   Gray                 300-450                    Negative             <300    50-75           Positive            >900                  Gray                300-900                  Negative            <300      >75             Positive            >1800                  Gray                300-1800                  Negative            <300    Sodium, Urine, Random - Urine, Clean Catch [[id number]] Collected: 12/27/23 1006    Specimen: Urine, Clean Catch Updated: 12/27/23 1050     Sodium, Urine <20 mmol/L     Narrative:      Reference intervals for random urine have not been established.  Clinical usage is dependent upon physician's interpretation in combination with other laboratory tests.       Hemoglobin A1c [[id number]]  (Abnormal) Collected: 12/26/23 2322    Specimen: Blood from Arm, Right Updated: 12/27/23 0027     Hemoglobin A1C 5.80 %     Basic Metabolic Panel [[id number]]  (Abnormal) Collected: 12/26/23 2322    Specimen: Blood from Arm, Right Updated: 12/27/23 0001     Glucose 161 mg/dL      BUN 7 mg/dL      Creatinine 0.58 mg/dL      Sodium 108 mmol/L      Potassium 3.6 mmol/L      Chloride 69 mmol/L      CO2 25.0 mmol/L      Calcium 8.6 mg/dL      BUN/Creatinine Ratio 12.1     Anion Gap 14.0 mmol/L      eGFR 112.3 mL/min/1.73     Narrative:      GFR Normal >60  Chronic Kidney Disease  <60  Kidney Failure <15      CBC & Differential [[id number]]  (Abnormal) Collected: 12/26/23 2322    Specimen: Blood from Arm, Right Updated: 12/26/23 2352    Narrative:      The following orders were created for panel order CBC & Differential.  Procedure                               Abnormality         Status                     ---------                               -----------         ------                     CBC Auto Differential[[id number]]        Abnormal            Final result                 Please view results for these tests on the individual orders.    CBC Auto Differential [[id number]]  (Abnormal) Collected: 12/26/23 2322    Specimen: Blood from Arm, Right Updated: 12/26/23 2352     WBC 4.80 10*3/mm3      RBC 4.54 10*6/mm3      Hemoglobin 15.1 g/dL      Hematocrit 41.7 %      MCV 91.8 fL      MCH 33.2 pg      MCHC 36.2 g/dL      RDW 12.4 %      RDW-SD 42.4 fl      MPV 8.4 fL      Platelets 177 10*3/mm3      Neutrophil % 85.5 %      Lymphocyte % 9.4 %      Monocyte % 5.0 %      Eosinophil % 0.0 %      Basophil % 0.1 %      Neutrophils, Absolute 4.10 10*3/mm3      Lymphocytes, Absolute 0.50 10*3/mm3      Monocytes, Absolute 0.20 10*3/mm3      Eosinophils, Absolute 0.00 10*3/mm3      Basophils, Absolute 0.00 10*3/mm3      nRBC 0.1 /100 WBC     Ethanol [[id number]] Collected: 12/26/23 2322    Specimen: Blood from Arm, Right Updated: 12/26/23 2347     Ethanol % <0.010 %     Narrative:      Plasma Ethanol Clinical Symptoms:    ETOH (%)               Clinical Symptom  .01-.05              No apparent influence  .03-.12              Euphoria, Diminished judgment and attention   .09-.25              Impaired comprehension, Muscle incoordination  .18-.30              Confusion, Staggered gait, Slurred speech  .25-.40              Markedly decreased response to stimuli, unable to stand or                        walk, vomitting, sleep or stupor  .35-.50              Comatose, Anesthesia, Subnormal body  temperature        POC CHEM 8 [[id number]]  (Abnormal) Collected: 12/26/23 1820    Specimen: Venous Blood Updated: 12/26/23 1823     Glucose 143 mg/dL      BUN 6 mg/dL      Creatinine 0.60 mg/dL      Sodium 108 mmol/L      POC Potassium 3.1 mmol/L      Chloride 73 mmol/L      Total CO2 25 mmol/L      Anion Gap 14.0 mmol/L      Comment: Serial Number: [id number]:  [id number]        Hemoglobin 15.3 g/dL      Hematocrit 45 %      Ionized Calcium 0.95 mmol/L      eGFR 111.2 mL/min/1.73     Comprehensive Metabolic Panel [[id number]]  (Abnormal) Collected: 12/26/23 1707    Specimen: Blood Updated: 12/26/23 1806     Glucose 134 mg/dL      BUN 7 mg/dL      Creatinine 0.70 mg/dL      Sodium 111 mmol/L      Potassium 3.1 mmol/L      Chloride 68 mmol/L      CO2 28.0 mmol/L      Calcium 8.9 mg/dL      Total Protein 7.3 g/dL      Albumin 4.3 g/dL      ALT (SGPT) 95 U/L      AST (SGOT) 112 U/L      Alkaline Phosphatase 123 U/L      Total Bilirubin 0.6 mg/dL      Globulin 3.0 gm/dL      A/G Ratio 1.4 g/dL      BUN/Creatinine Ratio 10.0     Anion Gap 15.0 mmol/L      eGFR 106.1 mL/min/1.73     Narrative:      GFR Normal >60  Chronic Kidney Disease <60  Kidney Failure <15      Single High Sensitivity Troponin T [[id number]]  (Normal) Collected: 12/26/23 1707    Specimen: Blood Updated: 12/26/23 1755     HS Troponin T 9 ng/L     Narrative:      High Sensitive Troponin T Reference Range:  <14.0 ng/L- Negative Female for AMI  <22.0 ng/L- Negative Male for AMI  >=14 - Abnormal Female indicating possible myocardial injury.  >=22 - Abnormal Male indicating possible myocardial injury.   Clinicians would have to utilize clinical acumen, EKG, Troponin, and serial changes to determine if it is an Acute Myocardial Infarction or myocardial injury due to an underlying chronic condition.         Respiratory Panel PCR w/COVID-19(SARS-CoV-2) FABY/BRIAN/KARLIE/PAD/COR/KYLAH In-House, NP Swab in UTM/VTM, 2 HR TAT - Swab, Nasopharynx [[id number]]  (Abnormal)  Collected: 12/26/23 1624    Specimen: Swab from Nasopharynx Updated: 12/26/23 1738     ADENOVIRUS, PCR Not Detected     Coronavirus 229E Not Detected     Coronavirus HKU1 Not Detected     Coronavirus NL63 Not Detected     Coronavirus OC43 Not Detected     COVID19 Not Detected     Human Metapneumovirus Not Detected     Human Rhinovirus/Enterovirus Not Detected     Influenza A H1 2009 PCR Detected     Influenza B PCR Not Detected     Parainfluenza Virus 1 Not Detected     Parainfluenza Virus 2 Not Detected     Parainfluenza Virus 3 Not Detected     Parainfluenza Virus 4 Not Detected     RSV, PCR Not Detected     Bordetella pertussis pcr Not Detected     Bordetella parapertussis PCR Not Detected     Chlamydophila pneumoniae PCR Not Detected     Mycoplasma pneumo by PCR Not Detected    Narrative:      In the setting of a positive respiratory panel with a viral infection PLUS a negative procalcitonin without other underlying concern for bacterial infection, consider observing off antibiotics or discontinuation of antibiotics and continue supportive care. If the respiratory panel is positive for atypical bacterial infection (Bordetella pertussis, Chlamydophila pneumoniae, or Mycoplasma pneumoniae), consider antibiotic de-escalation to target atypical bacterial infection.    BNP [[id number]]  (Normal) Collected: 12/26/23 1624    Specimen: Blood Updated: 12/26/23 1654     proBNP 105.7 pg/mL     Narrative:      This assay is used as an aid in the diagnosis of individuals suspected of having heart failure. It can be used as an aid in the diagnosis of acute decompensated heart failure (ADHF) in patients presenting with signs and symptoms of ADHF to the emergency department (ED). In addition, NT-proBNP of <300 pg/mL indicates ADHF is not likely.    Age Range Result Interpretation  NT-proBNP Concentration (pg/mL:      <50             Positive            >450                   Gray                 300-450                     Negative             <300    50-75           Positive            >900                  Gray                300-900                  Negative            <300      >75             Positive            >1800                  Gray                300-1800                  Negative            <300    CBC & Differential [[id number]]  (Abnormal) Collected: 12/26/23 1624    Specimen: Blood Updated: 12/26/23 1651    Narrative:      The following orders were created for panel order CBC & Differential.  Procedure                               Abnormality         Status                     ---------                               -----------         ------                     CBC Auto Differential[[id number]]        Abnormal            Final result                 Please view results for these tests on the individual orders.    CBC Auto Differential [[id number]]  (Abnormal) Collected: 12/26/23 1624    Specimen: Blood Updated: 12/26/23 1651     WBC 6.80 10*3/mm3      RBC 5.00 10*6/mm3      Hemoglobin 17.1 g/dL      Hematocrit 47.4 %      MCV 94.8 fL      MCH 34.2 pg      MCHC 36.1 g/dL      RDW 12.6 %      RDW-SD 42.0 fl      MPV 8.4 fL      Platelets 222 10*3/mm3      Neutrophil % 86.8 %      Lymphocyte % 6.8 %      Monocyte % 6.1 %      Eosinophil % 0.1 %      Basophil % 0.2 %      Neutrophils, Absolute 5.90 10*3/mm3      Lymphocytes, Absolute 0.50 10*3/mm3      Monocytes, Absolute 0.40 10*3/mm3      Eosinophils, Absolute 0.00 10*3/mm3      Basophils, Absolute 0.00 10*3/mm3      nRBC 0.2 /100 WBC           Imaging Results (Last 24 Hours)       Procedure Component Value Units Date/Time    XR Chest 2 View [[id number]] Collected: 12/26/23 1734     Updated: 12/26/23 1737    Narrative:      XR CHEST 2 VW    Date of Exam: 12/26/2023 5:24 PM EST    Indication: CHF/COPD Protocol  CHF/COPD Protocol    Comparison: None available.    Findings:  Lungs normal expanded. Mild cardiomegaly. Tortuous ectatic thoracic aorta. No pneumothorax or pleural  effusion or focal pulmonary parenchymal opacity. Pulmonary vessels are distinct. Mild spondylosis thoracic spine.      Impression:      Impression:  Cardiomegaly. No acute cardiopulmonary abnormality.      Electronically Signed: Madai Matta MD    2023 5:35 PM EST    Workstation ID: [id number]          ECG/EMG Results (last 24 hours)       Procedure Component Value Units Date/Time    ECG 12 Lead Chest Pain [[id number]] Collected: 23 1549     Updated: 23 0800     QT Interval 405 ms      QTC Interval 467 ms     Narrative:      HEART RATE= 80  bpm  RR Interval= 752  ms  RI Interval= 203  ms  P Horizontal Axis= -27  deg  P Front Axis= 48  deg  QRSD Interval= 116  ms  QT Interval= 405  ms  QTcB= 467  ms  QRS Axis= -20  deg  T Wave Axis= 60  deg  - ABNORMAL ECG -  Sinus rhythm  Borderline prolonged RI interval  Left atrial enlargement  Incomplete left bundle branch block  Anterior Q waves, possibly due to ILBBB  No previous ECG available for comparison  Electronically Signed By: Prakash Aden ([place]) 27-Dec-2023 08:00:41  Date and Time of Study: 2023 15:49:30          Operative/Procedure Notes (last 24 hours)  Notes from 23 1321 through 23 1321   No notes of this type exist for this encounter.       Physician Progress Notes (last 24 hours)  Notes from 23 1321 through 23 1321   No notes of this type exist for this encounter.          Consult Notes (last 24 hours)        Matthew Morris MD at 23 1303          Consults  [facility] Consult Note      Patient Name: Manoj Cleary  : 1965  MRN: [id number]  Primary Care Physician:  Provider, No Known  Referring Physician: No Known Provider  Date of admission: 2023    Subjective     Reason for Consult:  hyponatremia    HPI:   Manoj Cleary is a 58 y.o. male admitted with hyponatremia.  His serum sodium level was 108 this morning prompting renal consultation.  I stopped his iv normal  saline and started 3% saline.  He has been feeling progressively worse for 3 days.  His family has the flu and he tested positive for influenza a.  He had a lot of nausea and worsening headache.  He was forcing liquids and was mainly drinking water.  He hasn't had much to eat in 3 days.  He denies diuretic use or diarrhea. He has had hypertension for a long time and has been prescribed metoprolol in the past but doesn't take it and doesn't go to a pcp.    Review of Systems:   14 point review of systems is otherwise negative except for mentioned above on HPI    Personal History     Past Medical History:   Diagnosis Date    Hypertension     Hyponatremia        Past Surgical History:   Procedure Laterality Date    HERNIA REPAIR         Family History: family history includes Heart disease in his father.    Social History:  reports that he has been smoking cigars. He has been exposed to tobacco smoke. He has never used smokeless tobacco. He reports current alcohol use of about 20.0 standard drinks of alcohol per week. He reports that he does not use drugs.    Home Medications:  Prior to Admission medications    Medication Sig Start Date End Date Taking? Authorizing Provider   aspirin 81 MG EC tablet Take 1 tablet by mouth Daily.   Yes Annie Winters MD   cetirizine (zyrTEC) 10 MG tablet Take 1 tablet by mouth Daily.   Yes Annie Winters MD   omeprazole (priLOSEC) 20 MG capsule Take 1 capsule by mouth Daily.   Yes Annie Winters MD       Allergies:  No Known Allergies    Objective     Vitals:   Temp:  [98.3 °F (36.8 °C)-98.4 °F (36.9 °C)] 98.3 °F (36.8 °C)  Heart Rate:  [61-85] 64  Resp:  [14-16] 16  BP: (131-198)/() 157/93  Flow (L/min):  [2] 2    Intake/Output Summary (Last 24 hours) at 12/27/2023 1303  Last data filed at 12/27/2023 0744  Gross per 24 hour   Intake 1000 ml   Output 2200 ml   Net -1200 ml       Physical Exam:    General Appearance: alert, oriented x 3, no acute distress  "  Skin: warm and dry  HEENT: oral mucosa normal, nonicteric sclera  Neck: supple, no JVD  Lungs: CTA  Heart: RRR, normal S1 and S2  Abdomen: soft, nontender, nondistended  : no palpable bladder  Extremities: no edema, cyanosis or clubbing  Neuro: normal speech and mental status     Scheduled Meds:     folic acid, 1 mg, Oral, Daily  metoprolol tartrate, 25 mg, Oral, Q12H  oxymetazoline, 2 spray, Each Nare, BID  sodium chloride, 10 mL, Intravenous, Q12H  sodium chloride, 10 mL, Intravenous, Q12H  thiamine (B-1) IV, 200 mg, Intravenous, Q8H   Followed by  [START ON 1/1/2024] thiamine, 100 mg, Oral, Daily      IV Meds:   sodium chloride, 25 mL/hr, Last Rate: 25 mL/hr (12/27/23 1122)        Results Reviewed:   I have personally reviewed the results from the time of this admission to 12/27/2023 13:03 EST     Lab Results   Component Value Date    GLUCOSE 122 (H) 12/27/2023    CALCIUM 8.8 12/27/2023     (C) 12/27/2023    K 3.7 12/27/2023    CO2 23.0 12/27/2023    CL 75 (L) 12/27/2023    BUN 8 12/27/2023    CREATININE 0.71 (L) 12/27/2023    BCR 11.3 12/27/2023    ANIONGAP 14.0 12/27/2023      Lab Results   Component Value Date    MG 1.8 12/27/2023    ALBUMIN 4.3 12/26/2023         Radiology noted    Assessment / Plan     ASSESSMENT:  Hyponatremia-with a low urine na and low urine osmolality, likely due to poor oral solute intake and high water intake the last few days (\"beer potomania syndrome\"); improving on 3% saline, will likely stop it later today  Benign essential hypertension-inadequately controlled  Influenza a    PLAN:  3% saline with serial sodium concentration determinations  Add po amlodpine  Will need counselling regarding alcohol cessation  nutrition    Thank you for involving us in the care of Manoj Cleary.  Please feel free to call with any questions.    Matthew Morris MD  12/27/23  13:03 EST    [facility]  [phone]    Electronically signed by Matthew Morris MD " at 23 1309       Alejandra YoungLAUREANO at 23 0800            Critical Care Consult Note   Manoj Cleary : 1965 MRN:[id number] LOS:0 ROOM: 15/15     Reason for admission: Hyponatremia     Assessment / Plan     Severe Hyponatremia  Nephrology following  Q2h neuro checks  3% NS ordered per Neph, labs per protocol  Urine sodium, TSH, Serum osmolality pending  Alcohol history documented as 20 drinks (beers)/week -- Last drink 23  Monitor Is/O    Influenza A  Supportive care -- Tessalon pearls, tylenol, Zofran for nausea     Essential hypertension -- Not on home medication  Patient given a one time dose of Norvasc 10 mg  Lopressor 25 mg BID    Elevated LFTs  Monitor and trend    Hypokalemia -- Replacing      Code Status (Patient has no pulse and is not breathing): CPR (Attempt to Resuscitate)  Medical Interventions (Patient has pulse or is breathing): Full Support       Nutrition: Diet: Fluid Restriction (240 mL/tray) Diets; 1500 mL/day; Fluid Consistency: Thin (IDDSI 0) Patient isn't on Tube Feeding     DVT prophylaxis:  Mechanical DVT prophylaxis orders are present.     History of Present illness     A 58 y.o. old male patient with PMH of hypertension, hyponatremia, presents to the hospital with complaints of increased fatigue, cough, and congestion for 5 days. He reports some left sided chest muscle pain after coughing yesterday. Patient also reported having an increase in mental fogginess and feeling light-headed the last couple of days.     In ER the patient's sodium was found 111 and potassium 3.1. Patient was also found to be influenz A positive. Nephrology was consulted and patient was started and a hypertonic saline drip. The patient's neuro status will be monitored in ICU.    ACP: CPR, Full Intervention. Patient spouse is his decision maker in the event that he is unable.     Patient was seen and examined on 23 at 08:01 EST .    Subjective / Review of systems     Review of  Systems   Constitutional:  Positive for fatigue. Negative for chills and fever.   Respiratory:  Positive for cough and shortness of breath. Negative for chest tightness.    Cardiovascular:  Negative for chest pain.   Gastrointestinal:  Negative for abdominal pain, nausea and vomiting.   Genitourinary:  Negative for difficulty urinating.   Musculoskeletal:  Positive for myalgias (Left chest). Negative for arthralgias.   Neurological:  Positive for dizziness and light-headedness. Negative for seizures.        Past Medical/Surgical/Social/Family History & Allergies     Past Medical History:   Diagnosis Date    Hypertension     Hyponatremia       Past Surgical History:   Procedure Laterality Date    HERNIA REPAIR        Social History     Socioeconomic History    Marital status:    Tobacco Use    Smoking status: Every Day     Types: Cigars     Passive exposure: Current    Smokeless tobacco: Never   Vaping Use    Vaping Use: Never used   Substance and Sexual Activity    Alcohol use: Yes     Alcohol/week: 20.0 standard drinks of alcohol     Types: 20 Cans of beer per week    Drug use: Never    Sexual activity: Defer      Family History   Problem Relation Age of Onset    Heart disease Father       No Known Allergies     Home Medications     Prior to Admission medications    Medication Sig Start Date End Date Taking? Authorizing Provider   aspirin 81 MG EC tablet Take 1 tablet by mouth Daily.   Yes Annie Winters MD   cetirizine (zyrTEC) 10 MG tablet Take 1 tablet by mouth Daily.   Yes Annie Winters MD   omeprazole (priLOSEC) 20 MG capsule Take 1 capsule by mouth Daily.   Yes Annie Winters MD        Objective / Physical Exam     Vital signs:  Temp: 98.4 °F (36.9 °C)  BP: 138/81  Heart Rate: 61  Resp: 14  SpO2: 97 %  Weight: 96.5 kg (212 lb 11.9 oz)    Admission Weight: Weight: 96.5 kg (212 lb 11.9 oz)    Physical Exam  Vitals and nursing note reviewed.   Constitutional:       General: He is not  in acute distress.     Appearance: He is not ill-appearing.   HENT:      Head: Normocephalic.      Mouth/Throat:      Mouth: Mucous membranes are moist.      Pharynx: Oropharynx is clear.   Eyes:      Extraocular Movements: Extraocular movements intact.      Conjunctiva/sclera: Conjunctivae normal.      Pupils: Pupils are equal, round, and reactive to light.   Cardiovascular:      Rate and Rhythm: Normal rate and regular rhythm.      Pulses: Normal pulses.      Heart sounds: Normal heart sounds.   Pulmonary:      Effort: Pulmonary effort is normal.      Breath sounds: Normal breath sounds.   Abdominal:      General: Bowel sounds are normal.      Palpations: Abdomen is soft.   Musculoskeletal:      Right lower leg: No edema.      Left lower leg: No edema.   Skin:     General: Skin is warm and dry.      Findings: No rash.   Neurological:      Mental Status: He is alert and oriented to person, place, and time.   Psychiatric:         Mood and Affect: Mood normal.         Behavior: Behavior normal.          Labs     Results from last 7 days   Lab Units 12/26/23 2322 12/26/23 1820 12/26/23  1624   WBC 10*3/mm3 4.80  --  6.80   HEMATOCRIT % 41.7  --  47.4   HEMATOCRIT POC %  --  45  --    PLATELETS 10*3/mm3 177  --  222      Results from last 7 days   Lab Units 12/26/23  2322 12/26/23 1820 12/26/23  1707   SODIUM mmol/L 108*  --  111*   POTASSIUM mmol/L 3.6  --  3.1*   CHLORIDE mmol/L 69*  --  68*   CO2 mmol/L 25.0  --  28.0   BUN mg/dL 7  --  7   CREATININE mg/dL 0.58* 0.60 0.70*        Imaging     XR Chest 2 View    Result Date: 12/26/2023  Impression: Cardiomegaly. No acute cardiopulmonary abnormality. Electronically Signed: Madai Matta MD  12/26/2023 5:35 PM EST  Workstation ID: [id number]      Chest X ray: My independent assessment showed no infiltrates or effusions    EKG: My independent evaluation showed normal sinus rhythm, no ST -T changes    Current Medications     Scheduled Meds:  folic acid, 1 mg, Oral,  Daily  metoprolol tartrate, 25 mg, Oral, Q12H  oxymetazoline, 2 spray, Each Nare, BID  sodium chloride, 10 mL, Intravenous, Q12H  sodium chloride, 10 mL, Intravenous, Q12H  thiamine (B-1) IV, 200 mg, Intravenous, Q8H   Followed by  [START ON 1/1/2024] thiamine, 100 mg, Oral, Daily         Continuous Infusions:  sodium chloride, 25 mL/hr, Last Rate: 25 mL/hr (12/27/23 1122)         LAUREANO Turcios   Critical Care  12/27/23   08:01 EST        Electronically signed by Alejandra Young APRN at 12/27/23 123

## 2023-12-28 LAB
ANION GAP SERPL CALCULATED.3IONS-SCNC: 10 MMOL/L (ref 5–15)
ANION GAP SERPL CALCULATED.3IONS-SCNC: 11 MMOL/L (ref 5–15)
ANION GAP SERPL CALCULATED.3IONS-SCNC: 8 MMOL/L (ref 5–15)
BUN SERPL-MCNC: 10 MG/DL (ref 6–20)
BUN SERPL-MCNC: 8 MG/DL (ref 6–20)
BUN SERPL-MCNC: 9 MG/DL (ref 6–20)
BUN/CREAT SERPL: 10.6 (ref 7–25)
BUN/CREAT SERPL: 12 (ref 7–25)
BUN/CREAT SERPL: 9.3 (ref 7–25)
CALCIUM SPEC-SCNC: 8.5 MG/DL (ref 8.6–10.5)
CALCIUM SPEC-SCNC: 8.5 MG/DL (ref 8.6–10.5)
CALCIUM SPEC-SCNC: 8.7 MG/DL (ref 8.6–10.5)
CHLORIDE SERPL-SCNC: 83 MMOL/L (ref 98–107)
CHLORIDE SERPL-SCNC: 85 MMOL/L (ref 98–107)
CHLORIDE SERPL-SCNC: 89 MMOL/L (ref 98–107)
CO2 SERPL-SCNC: 25 MMOL/L (ref 22–29)
CO2 SERPL-SCNC: 25 MMOL/L (ref 22–29)
CO2 SERPL-SCNC: 26 MMOL/L (ref 22–29)
CREAT SERPL-MCNC: 0.83 MG/DL (ref 0.76–1.27)
CREAT SERPL-MCNC: 0.85 MG/DL (ref 0.76–1.27)
CREAT SERPL-MCNC: 0.86 MG/DL (ref 0.76–1.27)
EGFRCR SERPLBLD CKD-EPI 2021: 100.1 ML/MIN/1.73
EGFRCR SERPLBLD CKD-EPI 2021: 100.8 ML/MIN/1.73
EGFRCR SERPLBLD CKD-EPI 2021: 99.7 ML/MIN/1.73
GLUCOSE BLDC GLUCOMTR-MCNC: 129 MG/DL (ref 70–105)
GLUCOSE SERPL-MCNC: 108 MG/DL (ref 65–99)
GLUCOSE SERPL-MCNC: 127 MG/DL (ref 65–99)
GLUCOSE SERPL-MCNC: 146 MG/DL (ref 65–99)
NT-PROBNP SERPL-MCNC: <36 PG/ML (ref 0–900)
OSMOLALITY SERPL: 250 MOSM/KG (ref 275–295)
OSMOLALITY SERPL: 259 MOSM/KG (ref 275–295)
OSMOLALITY SERPL: 262 MOSM/KG (ref 275–295)
OSMOLALITY UR: 132 MOSM/KG (ref 300–800)
POTASSIUM SERPL-SCNC: 3.6 MMOL/L (ref 3.5–5.2)
POTASSIUM SERPL-SCNC: 3.9 MMOL/L (ref 3.5–5.2)
POTASSIUM SERPL-SCNC: 4 MMOL/L (ref 3.5–5.2)
SODIUM SERPL-SCNC: 118 MMOL/L (ref 136–145)
SODIUM SERPL-SCNC: 119 MMOL/L (ref 136–145)
SODIUM SERPL-SCNC: 121 MMOL/L (ref 136–145)
SODIUM SERPL-SCNC: 123 MMOL/L (ref 136–145)

## 2023-12-28 PROCEDURE — 80048 BASIC METABOLIC PNL TOTAL CA: CPT | Performed by: INTERNAL MEDICINE

## 2023-12-28 PROCEDURE — 25810000003 SODIUM CHLORIDE 3 % SOLUTION: Performed by: INTERNAL MEDICINE

## 2023-12-28 PROCEDURE — 84295 ASSAY OF SERUM SODIUM: CPT | Performed by: INTERNAL MEDICINE

## 2023-12-28 PROCEDURE — 25010000002 THIAMINE HCL 200 MG/2ML SOLUTION

## 2023-12-28 PROCEDURE — 83930 ASSAY OF BLOOD OSMOLALITY: CPT | Performed by: INTERNAL MEDICINE

## 2023-12-28 PROCEDURE — 0 DEXTROSE 5 % SOLUTION: Performed by: INTERNAL MEDICINE

## 2023-12-28 PROCEDURE — 82948 REAGENT STRIP/BLOOD GLUCOSE: CPT

## 2023-12-28 PROCEDURE — 83935 ASSAY OF URINE OSMOLALITY: CPT | Performed by: INTERNAL MEDICINE

## 2023-12-28 PROCEDURE — 83880 ASSAY OF NATRIURETIC PEPTIDE: CPT | Performed by: INTERNAL MEDICINE

## 2023-12-28 RX ORDER — POTASSIUM CHLORIDE 20 MEQ/1
40 TABLET, EXTENDED RELEASE ORAL EVERY 4 HOURS
Status: COMPLETED | OUTPATIENT
Start: 2023-12-28 | End: 2023-12-28

## 2023-12-28 RX ORDER — HYDROCODONE POLISTIREX AND CHLORPHENIRAMINE POLISTIREX 10; 8 MG/5ML; MG/5ML
5 SUSPENSION, EXTENDED RELEASE ORAL EVERY 12 HOURS PRN
Status: DISCONTINUED | OUTPATIENT
Start: 2023-12-28 | End: 2023-12-30 | Stop reason: HOSPADM

## 2023-12-28 RX ORDER — 3% SODIUM CHLORIDE 3 G/100ML
25 INJECTION, SOLUTION INTRAVENOUS CONTINUOUS
Status: DISCONTINUED | OUTPATIENT
Start: 2023-12-28 | End: 2023-12-29

## 2023-12-28 RX ADMIN — Medication 2 SPRAY: at 08:22

## 2023-12-28 RX ADMIN — POTASSIUM CHLORIDE 40 MEQ: 1500 TABLET, EXTENDED RELEASE ORAL at 20:19

## 2023-12-28 RX ADMIN — Medication 2 SPRAY: at 20:20

## 2023-12-28 RX ADMIN — HYDROCODONE BITARTRATE AND ACETAMINOPHEN 1 TABLET: 5; 325 TABLET ORAL at 21:30

## 2023-12-28 RX ADMIN — THIAMINE HYDROCHLORIDE 200 MG: 100 INJECTION, SOLUTION INTRAMUSCULAR; INTRAVENOUS at 13:35

## 2023-12-28 RX ADMIN — BENZONATATE 100 MG: 100 CAPSULE ORAL at 06:11

## 2023-12-28 RX ADMIN — SODIUM CHLORIDE 25 ML/HR: 3 INJECTION, SOLUTION INTRAVENOUS at 16:13

## 2023-12-28 RX ADMIN — METOPROLOL TARTRATE 25 MG: 25 TABLET, FILM COATED ORAL at 08:22

## 2023-12-28 RX ADMIN — Medication 10 ML: at 20:20

## 2023-12-28 RX ADMIN — METOPROLOL TARTRATE 25 MG: 25 TABLET, FILM COATED ORAL at 20:19

## 2023-12-28 RX ADMIN — DEXTROSE MONOHYDRATE 100 ML/HR: 50 INJECTION, SOLUTION INTRAVENOUS at 06:13

## 2023-12-28 RX ADMIN — THIAMINE HYDROCHLORIDE 200 MG: 100 INJECTION, SOLUTION INTRAMUSCULAR; INTRAVENOUS at 21:30

## 2023-12-28 RX ADMIN — AMLODIPINE BESYLATE 5 MG: 5 TABLET ORAL at 08:22

## 2023-12-28 RX ADMIN — BENZONATATE 100 MG: 100 CAPSULE ORAL at 13:38

## 2023-12-28 RX ADMIN — HYDROCODONE BITARTRATE AND ACETAMINOPHEN 1 TABLET: 5; 325 TABLET ORAL at 13:38

## 2023-12-28 RX ADMIN — POTASSIUM CHLORIDE 40 MEQ: 1500 TABLET, EXTENDED RELEASE ORAL at 16:17

## 2023-12-28 RX ADMIN — Medication 10 ML: at 08:22

## 2023-12-28 RX ADMIN — OSELTAMIVIR PHOSPHATE 75 MG: 75 CAPSULE ORAL at 08:22

## 2023-12-28 RX ADMIN — THIAMINE HYDROCHLORIDE 200 MG: 100 INJECTION, SOLUTION INTRAMUSCULAR; INTRAVENOUS at 06:11

## 2023-12-28 RX ADMIN — FOLIC ACID 1 MG: 1 TABLET ORAL at 08:22

## 2023-12-28 NOTE — PROGRESS NOTES
Critical Care Progress Note   Manoj Cleary : 1965 MRN:[id number] LOS:1     Principal Problem: Hyponatremia     Reason for follow up: All the medical problems listed below    Summary     A 58 y.o. male admitted with a principal diagnosis of Hyponatremia.      Significant events     23 : Afeb, VSS.  Has had 4.2 L UOP last 24h; net -2.8 L.  Na up to 119.  Cl remains quite low.  A1c wnl at 5.8%.  TSH mildly elevated at 5.1--no indication to treat.  CBC unremarkable.      Assessment / Plan     Severe Hyponatremia  Nephrology following  Q2h neuro checks  3% NS ordered per Neph, labs per protocol  Urine sodium, TSH, Serum osmolality pending  Alcohol history documented as 20 drinks (beers)/week -- Last drink 23  Monitor I/O  1.5 L fluid restriction       Influenza A  Supportive care -- Tessalon pearls, tylenol, Zofran for nausea   D/C Tamiflu as pt is out of range for it to help       Essential hypertension -- Not on home medication  Patient given a one time dose of Norvasc 10 mg  Lopressor 25 mg BID     Elevated LFTs  Monitor and trend       Hypokalemia -- Replacing    Code status:   Code Status (Patient has no pulse and is not breathing): CPR (Attempt to Resuscitate)  Medical Interventions (Patient has pulse or is breathing): Full Support       Nutrition: Diet: Regular/House Diet, Fluid Restriction (240 mL/tray) Diets; 1500 mL/day; Texture: Regular Texture (IDDSI 7); Fluid Consistency: Thin (IDDSI 0)   Patient isn't on Tube Feeding    DVT prophylaxis:  Mechanical DVT prophylaxis orders are present.     Subjective / Review of systems     Review of Systems   Pt complains of pain in left side with coughing and coughing with deep breaths.  Denies f/c/s/n/v/d.  Denies SOB.    Objective / Physical Exam   Vital signs:  Temp: 97.8 °F (36.6 °C)  BP: 132/78  Heart Rate: 57  Resp: (!) 7  SpO2: 95 %  Weight: 96.4 kg (212 lb 8.4 oz)    Admission Weight: Weight: 96.5 kg (212 lb 11.9 oz)  Current Weight: Weight:  96.4 kg (212 lb 8.4 oz)    Input/Output in last 24 hours:    Intake/Output Summary (Last 24 hours) at 12/28/2023 1156  Last data filed at 12/28/2023 1100  Gross per 24 hour   Intake 1894 ml   Output 3475 ml   Net -1581 ml      Physical Exam     GEN:  Pleasant.  Appears appropriate for stated age.  WD/WN/WH.  Sitting up on side of bed on RA.  NAD.  NEURO:  Brainstem reflexes intact.  No obvious focal deficit.  Moves all 4 ext.  HEENT:  N/AT.  PERRL.  MMM.  Oropharynx non-erythematous.  No drainage from the eyes/ears/nose.  No conjunctival petechiae.  No oral thrush.  Auditory and visual acuity grossly wnl.  Good dentition.  Voice normal.  NECK:  Supple, NT, trachea midline.  No meningismus.  No ROM limitation.  No torticollis.  No JVD.  No thyromegaly.    CHEST/LUNGS:  Breath sounds are noted to be slightly coarse in the left base (minimally), no rales or wheezing.  Chest excursion equal bilaterally.    CARDIOVASCULAR:  RRR w/o murmur noted.   GI:  Abdomen soft, NT, ND, +BS.  No HSM.  :  No walsh cath in place.  EXTREMITIES:  No deformity or amputation.  No cyanosis, edema, or asymmetry.  Pulses 2+ and equal in BLE's.  Some venous stasis skin changes noted.  SKIN:  Warm, dry, and pink.  No rash, breakdown, or track marks noted. Pt noted to have a large bruise on left upper flank--outlined today  LYMPHATICS/HEME:  No overt LAD.  No lymphedema.  MSK:  Normal ROM.  No joint abnormalities noted.  Strength is 5/5 and equal in BUE and BLE's.  PSYCH:  Pleasant.  A&Ox 3.  Normal mood and affect.  Responds appropriately to commands and appears to comprehend instructions.        Radiology and Labs     Results from last 7 days   Lab Units 12/26/23  2322 12/26/23  1820 12/26/23  1624   WBC 10*3/mm3 4.80  --  6.80   HEMATOCRIT % 41.7  --  47.4   HEMATOCRIT POC %  --  45  --    PLATELETS 10*3/mm3 177  --  222      Results from last 7 days   Lab Units 12/28/23  0427 12/27/23  2211 12/27/23  1559 12/27/23  1015 12/26/23  0348  12/26/23  1820 12/26/23  1707   SODIUM mmol/L 119* 119* 118* 112* 108*  --  111*   POTASSIUM mmol/L  --  4.1  --  3.7 3.6  --  3.1*   CHLORIDE mmol/L  --  83*  --  75* 69*  --  68*   CO2 mmol/L  --  27.0  --  23.0 25.0  --  28.0   BUN mg/dL  --  10  --  8 7  --  7   CREATININE mg/dL  --  0.81  --  0.71* 0.58* 0.60 0.70*      Current medications   Scheduled Meds: amLODIPine, 5 mg, Oral, Q24H  folic acid, 1 mg, Oral, Daily  metoprolol tartrate, 25 mg, Oral, Q12H  oseltamivir, 75 mg, Oral, Q12H  oxymetazoline, 2 spray, Each Nare, BID  sodium chloride, 10 mL, Intravenous, Q12H  sodium chloride, 10 mL, Intravenous, Q12H  thiamine (B-1) IV, 200 mg, Intravenous, Q8H   Followed by  [START ON 1/1/2024] thiamine, 100 mg, Oral, Daily      Continuous Infusions:      Plan discussed with RN. Reviewed all other data in the last 24 hours, including but not limited to vitals, labs, microbiology, imaging and pertinent notes from other providers.     Uche Ho, DO   Critical Care  12/28/23   11:56 EST

## 2023-12-28 NOTE — CASE MANAGEMENT/SOCIAL WORK
Social Work Assessment   Greg     Patient Name: Manoj Cleary  MRN: [id number]  Today's Date: 12/28/2023    Admit Date: 12/26/2023     Discharge Plan       Row Name 12/28/23 1440       Plan    Plan Comments Sw following for ETOH. Pt had several family in room and will come back to have further discussion regarding ETOH/MH. Sw to follow back up.      KOTA Blankenship, MSW    Phone: [phone]  Fax: [phone]  Email: [email or web address]

## 2023-12-28 NOTE — PROGRESS NOTES
[facility] Progress Note      Patient Name: Manoj Cleary  : 1965  MRN: [id number]  Primary Care Physician:  Provider, No Known  Date of admission: 2023    Subjective     Interval History:   Sleepy, snoring, arousable    Review of Systems:   14 point review of systems is otherwise negative except for mentioned above on HPI    Objective     Vitals:   Temp:  [97.4 °F (36.3 °C)-98.4 °F (36.9 °C)] 98.1 °F (36.7 °C)  Heart Rate:  [57-81] 57  Resp:  [7-17] 9  BP: (116-156)/(69-97) 132/78  Flow (L/min):  [2] 2    Intake/Output Summary (Last 24 hours) at 2023 1312  Last data filed at 2023 1100  Gross per 24 hour   Intake 1894 ml   Output 3475 ml   Net -1581 ml       Physical Exam:    General Appearance: alert, oriented x 3, no acute distress   Skin: warm and dry  HEENT: oral mucosa normal, nonicteric sclera  Neck: supple, no JVD  Lungs: CTA  Heart: RRR, normal S1 and S2  Abdomen: soft, nontender, nondistended  : no palpable bladder  Extremities: no edema, cyanosis or clubbing  Neuro: normal speech and mental status     Scheduled Meds:     amLODIPine, 5 mg, Oral, Q24H  folic acid, 1 mg, Oral, Daily  metoprolol tartrate, 25 mg, Oral, Q12H  oxymetazoline, 2 spray, Each Nare, BID  sodium chloride, 10 mL, Intravenous, Q12H  sodium chloride, 10 mL, Intravenous, Q12H  thiamine (B-1) IV, 200 mg, Intravenous, Q8H   Followed by  [START ON 2024] thiamine, 100 mg, Oral, Daily      IV Meds:        Results Reviewed:   I have personally reviewed the results from the time of this admission to 2023 13:12 EST     Results from last 7 days   Lab Units 23  0427 23  2211 23  1559 23  1015 23  2322 23  1820 23  1707   SODIUM mmol/L 119* 119* 118* 112* 108*  --  111*   POTASSIUM mmol/L  --  4.1  --  3.7 3.6  --  3.1*   CHLORIDE mmol/L  --  83*  --  75* 69*  --  68*   CO2 mmol/L  --  27.0  --  23.0 25.0  --  28.0   BUN mg/dL  --  10  --   "8 7  --  7   CREATININE mg/dL  --  0.81  --  0.71* 0.58*   < > 0.70*   CALCIUM mg/dL  --  8.7  --  8.8 8.6  --  8.9   BILIRUBIN mg/dL  --   --   --   --   --   --  0.6   ALK PHOS U/L  --   --   --   --   --   --  123*   ALT (SGPT) U/L  --   --   --   --   --   --  95*   AST (SGOT) U/L  --   --   --   --   --   --  112*   GLUCOSE mg/dL  --  108*  --  122* 161*  --  134*    < > = values in this interval not displayed.     Estimated Creatinine Clearance: 112.5 mL/min (by C-G formula based on SCr of 0.81 mg/dL).  Results from last 7 days   Lab Units 12/27/23  1015   MAGNESIUM mg/dL 1.8         Results from last 7 days   Lab Units 12/26/23  2322 12/26/23  1820 12/26/23  1624   WBC 10*3/mm3 4.80  --  6.80   HEMOGLOBIN g/dL 15.1  --  17.1   HEMOGLOBIN, POC g/dL  --  15.3  --    PLATELETS 10*3/mm3 177  --  222           Assessment / Plan     ASSESSMENT:  Hyponatremia-resolving slowly and nicely, with a low urine na and low urine osmolality, likely due to poor oral solute intake and high water intake the last few days (\"beer potomania syndrome\"); now off ivf, recheck bmp at 1pm, 2.   2.   Benign essential hypertension better  3.   Influenza a     PLAN:  off ivf for now  continue po amlodpine  Will need counselling regarding alcohol cessation, may be in early stages of withdrawal now  nutrition       Thank you for involving us in the care of Manoj Cleary.  Please feel free to call with any questions.    Matthew Morris MD  12/28/23  13:12 [facility]    [facility]  [phone]    "

## 2023-12-28 NOTE — CASE MANAGEMENT/SOCIAL WORK
Continued Stay Note  RON Garza     Patient Name: Manoj Cleary  MRN: [id number]  Today's Date: 12/28/2023    Admit Date: 12/26/2023    Plan: DC Plan: Anticipate routine home with spouse. New PCP appt on AVS. CSW following for ETOH use.   Discharge Plan       Row Name 12/28/23 1122       Plan    Plan DC Plan: Anticipate routine home with spouse. New PCP appt on AVS. CSW following for ETOH use.    Provided Post Acute Provider List? N/A    Provided Post Acute Provider Quality & Resource List? N/A    Plan Comments CM spoke with intensivist, nursing, and NP to obtain clinical upates in morning rounds. CM informed CSW of reported ETOH consumption that was reported by nusing as several times a week. Patient reported to nursing drinking Fireball during 12 hours of chest pain period. CSW to follow up. Patient placed on fluid restriction per Intensivist.CM will continue to follow for any further needs and adjust discharge plan accordingly. DC Barriers: Cardiac monitoring, O2@2L nc, PICC, IV Thiamine, Hyponatremia, and CIWA.               Expected Discharge Date and Time       Expected Discharge Date Expected Discharge Time    Dec 31, 2023               Ofelia Lockwood RN     Office Phone: [phone]  Office Cell:     [phone]

## 2023-12-29 ENCOUNTER — APPOINTMENT (OUTPATIENT)
Dept: CT IMAGING | Facility: HOSPITAL | Age: 58
DRG: 194 | End: 2023-12-29
Payer: OTHER GOVERNMENT

## 2023-12-29 LAB
ANION GAP SERPL CALCULATED.3IONS-SCNC: 10 MMOL/L (ref 5–15)
ANION GAP SERPL CALCULATED.3IONS-SCNC: 9 MMOL/L (ref 5–15)
BASOPHILS # BLD AUTO: 0 10*3/MM3 (ref 0–0.2)
BASOPHILS NFR BLD AUTO: 0.7 % (ref 0–1.5)
BUN SERPL-MCNC: 10 MG/DL (ref 6–20)
BUN SERPL-MCNC: 8 MG/DL (ref 6–20)
BUN/CREAT SERPL: 13.6 (ref 7–25)
BUN/CREAT SERPL: 14.9 (ref 7–25)
CALCIUM SPEC-SCNC: 7.8 MG/DL (ref 8.6–10.5)
CALCIUM SPEC-SCNC: 8.4 MG/DL (ref 8.6–10.5)
CHLORIDE SERPL-SCNC: 106 MMOL/L (ref 98–107)
CHLORIDE SERPL-SCNC: 92 MMOL/L (ref 98–107)
CO2 SERPL-SCNC: 24 MMOL/L (ref 22–29)
CO2 SERPL-SCNC: 25 MMOL/L (ref 22–29)
CREAT SERPL-MCNC: 0.59 MG/DL (ref 0.76–1.27)
CREAT SERPL-MCNC: 0.67 MG/DL (ref 0.76–1.27)
DEPRECATED RDW RBC AUTO: 42 FL (ref 37–54)
EGFRCR SERPLBLD CKD-EPI 2021: 107.6 ML/MIN/1.73
EGFRCR SERPLBLD CKD-EPI 2021: 111.8 ML/MIN/1.73
EOSINOPHIL # BLD AUTO: 0.1 10*3/MM3 (ref 0–0.4)
EOSINOPHIL NFR BLD AUTO: 1.6 % (ref 0.3–6.2)
ERYTHROCYTE [DISTWIDTH] IN BLOOD BY AUTOMATED COUNT: 12.4 % (ref 12.3–15.4)
GLUCOSE SERPL-MCNC: 131 MG/DL (ref 65–99)
GLUCOSE SERPL-MCNC: 97 MG/DL (ref 65–99)
HCT VFR BLD AUTO: 38.4 % (ref 37.5–51)
HGB BLD-MCNC: 13.3 G/DL (ref 13–17.7)
LYMPHOCYTES # BLD AUTO: 1.5 10*3/MM3 (ref 0.7–3.1)
LYMPHOCYTES NFR BLD AUTO: 21 % (ref 19.6–45.3)
MCH RBC QN AUTO: 34.1 PG (ref 26.6–33)
MCHC RBC AUTO-ENTMCNC: 34.7 G/DL (ref 31.5–35.7)
MCV RBC AUTO: 98.3 FL (ref 79–97)
MONOCYTES # BLD AUTO: 0.4 10*3/MM3 (ref 0.1–0.9)
MONOCYTES NFR BLD AUTO: 6.1 % (ref 5–12)
NEUTROPHILS NFR BLD AUTO: 5 10*3/MM3 (ref 1.7–7)
NEUTROPHILS NFR BLD AUTO: 70.6 % (ref 42.7–76)
NRBC BLD AUTO-RTO: 0.1 /100 WBC (ref 0–0.2)
OSMOLALITY SERPL: 261 MOSM/KG (ref 275–295)
OSMOLALITY SERPL: 263 MOSM/KG (ref 275–295)
OSMOLALITY SERPL: 263 MOSM/KG (ref 275–295)
OSMOLALITY SERPL: 266 MOSM/KG (ref 275–295)
PLATELET # BLD AUTO: 206 10*3/MM3 (ref 140–450)
PMV BLD AUTO: 8 FL (ref 6–12)
POTASSIUM SERPL-SCNC: 4.2 MMOL/L (ref 3.5–5.2)
POTASSIUM SERPL-SCNC: 4.3 MMOL/L (ref 3.5–5.2)
POTASSIUM SERPL-SCNC: 4.7 MMOL/L (ref 3.5–5.2)
RBC # BLD AUTO: 3.9 10*6/MM3 (ref 4.14–5.8)
SODIUM SERPL-SCNC: 125 MMOL/L (ref 136–145)
SODIUM SERPL-SCNC: 126 MMOL/L (ref 136–145)
SODIUM SERPL-SCNC: 126 MMOL/L (ref 136–145)
SODIUM SERPL-SCNC: 127 MMOL/L (ref 136–145)
SODIUM SERPL-SCNC: 127 MMOL/L (ref 136–145)
SODIUM SERPL-SCNC: 139 MMOL/L (ref 136–145)
WBC NRBC COR # BLD AUTO: 7 10*3/MM3 (ref 3.4–10.8)

## 2023-12-29 PROCEDURE — 85025 COMPLETE CBC W/AUTO DIFF WBC: CPT | Performed by: NURSE PRACTITIONER

## 2023-12-29 PROCEDURE — 83930 ASSAY OF BLOOD OSMOLALITY: CPT | Performed by: INTERNAL MEDICINE

## 2023-12-29 PROCEDURE — 84132 ASSAY OF SERUM POTASSIUM: CPT | Performed by: INTERNAL MEDICINE

## 2023-12-29 PROCEDURE — 80048 BASIC METABOLIC PNL TOTAL CA: CPT | Performed by: INTERNAL MEDICINE

## 2023-12-29 PROCEDURE — 84295 ASSAY OF SERUM SODIUM: CPT | Performed by: INTERNAL MEDICINE

## 2023-12-29 PROCEDURE — 74176 CT ABD & PELVIS W/O CONTRAST: CPT

## 2023-12-29 PROCEDURE — 71250 CT THORAX DX C-: CPT

## 2023-12-29 PROCEDURE — 25010000002 THIAMINE HCL 200 MG/2ML SOLUTION

## 2023-12-29 RX ORDER — POLYETHYLENE GLYCOL 3350 17 G/17G
17 POWDER, FOR SOLUTION ORAL DAILY
Status: DISCONTINUED | OUTPATIENT
Start: 2023-12-29 | End: 2023-12-30 | Stop reason: HOSPADM

## 2023-12-29 RX ORDER — DOCUSATE SODIUM 100 MG/1
100 CAPSULE, LIQUID FILLED ORAL 2 TIMES DAILY
Status: DISCONTINUED | OUTPATIENT
Start: 2023-12-29 | End: 2023-12-30 | Stop reason: HOSPADM

## 2023-12-29 RX ORDER — SODIUM CHLORIDE 1 G/1
1 TABLET ORAL
Status: DISCONTINUED | OUTPATIENT
Start: 2023-12-29 | End: 2023-12-30 | Stop reason: HOSPADM

## 2023-12-29 RX ORDER — AMOXICILLIN 250 MG
2 CAPSULE ORAL 2 TIMES DAILY PRN
Status: DISCONTINUED | OUTPATIENT
Start: 2023-12-29 | End: 2023-12-30 | Stop reason: HOSPADM

## 2023-12-29 RX ADMIN — METOPROLOL TARTRATE 25 MG: 25 TABLET, FILM COATED ORAL at 08:56

## 2023-12-29 RX ADMIN — AMLODIPINE BESYLATE 5 MG: 5 TABLET ORAL at 08:56

## 2023-12-29 RX ADMIN — Medication 10 ML: at 08:57

## 2023-12-29 RX ADMIN — SODIUM CHLORIDE 1 G: 1 TABLET ORAL at 13:36

## 2023-12-29 RX ADMIN — SODIUM CHLORIDE 1 G: 1 TABLET ORAL at 18:04

## 2023-12-29 RX ADMIN — DOCUSATE SODIUM 100 MG: 100 CAPSULE, LIQUID FILLED ORAL at 20:04

## 2023-12-29 RX ADMIN — Medication 10 ML: at 19:54

## 2023-12-29 RX ADMIN — Medication 10 ML: at 20:37

## 2023-12-29 RX ADMIN — METOPROLOL TARTRATE 25 MG: 25 TABLET, FILM COATED ORAL at 20:03

## 2023-12-29 RX ADMIN — THIAMINE HYDROCHLORIDE 200 MG: 100 INJECTION, SOLUTION INTRAMUSCULAR; INTRAVENOUS at 05:39

## 2023-12-29 RX ADMIN — THIAMINE HYDROCHLORIDE 200 MG: 100 INJECTION, SOLUTION INTRAMUSCULAR; INTRAVENOUS at 21:41

## 2023-12-29 RX ADMIN — HYDROCODONE POLISTIREX AND CHLORPHENIRAMINE POLISTIREX 5 ML: 10; 8 SUSPENSION, EXTENDED RELEASE ORAL at 20:37

## 2023-12-29 RX ADMIN — THIAMINE HYDROCHLORIDE 200 MG: 100 INJECTION, SOLUTION INTRAMUSCULAR; INTRAVENOUS at 13:36

## 2023-12-29 RX ADMIN — HYDROCODONE BITARTRATE AND ACETAMINOPHEN 1 TABLET: 5; 325 TABLET ORAL at 05:51

## 2023-12-29 RX ADMIN — Medication 10 ML: at 20:04

## 2023-12-29 RX ADMIN — POLYETHYLENE GLYCOL 3350 17 G: 17 POWDER, FOR SOLUTION ORAL at 19:56

## 2023-12-29 RX ADMIN — Medication 2 SPRAY: at 08:56

## 2023-12-29 RX ADMIN — Medication 10 ML: at 08:56

## 2023-12-29 RX ADMIN — FOLIC ACID 1 MG: 1 TABLET ORAL at 08:56

## 2023-12-29 NOTE — PROGRESS NOTES
Hospitalist Service Progress Note     Manoj Cleary : 1965 MRN:[id number] LOS:2  Rm: 2204/1     Principal Problem: Hyponatremia     Reason for follow up: All the medical problems listed below    Summary     Manoj Cleary is a very pleasant 58 y.o. male with a CMH of hypertension and obesity  who presented to [facility] on 2023 with  flu -like symptoms since last Thursday.  He reports nasal congestion and paroxysmal coughing spells with rib pain secondary to the coughing spells.  He denies any shortness of breath, nausea vomiting diarrhea.  He is currently afebrile.  His wife at bedside reports she went to urgent care and tested for flu a as well as their son.  Patient reports he declined to be tested.  He does however test positive for influenza A today.  He is stable on room air.  Blood pressure was elevated on admission.  He reports he does not see a primary care provider and had high blood pressure in the past on metoprolol however he has not been taking any medications for quite a while.  He may likely have been discharged however his labs came back with a critical sodium of 108.  He reports both his son and he have had low sodium in the past although they do not know how low.  He does report he drinks at least 2 beers a day sometimes more.     Patient was discovered to have beer potomania with hypo-osmolar hyponatremia.  Nephrology was consulted for management.  He was started on 3% NS, which has now been discontinued.  Patient was discovered to have a new bruise on his left lateral chest/flank, which he denied trauma, fall, but also does not have a lot of memory of past events.  Patient has been deemed medically stable for downgrade.  Nephrology has signed off.  Hospitalist will now assume medical management from intensivist service.    Significant Events     23 : Check CT Chest Abdomen and pelvis.  Added sodium tablets.Check CBC.  Likely discharge in AM if labs remain  stable.    Assessment / Plan     Hyponatremia, secondary to beer potomania syndrome  Previously on 3% NS, titrated off, managed by nephrology, has resolved.  Nephrology signed off.  Hypoosmolar, continue to monitor sodium and serum osmolality to ensure resolution.  If further sodium decrease, will add sodium chloride tablets.  Continue fluid restriction.     Pneumonia due to influenza A  Supportive care.  Outside window for tamiflu.  Continue precautions per hospital protocols.     Large bruise on left lateral chest/flank  Unknown etiology, patient reports it appeared yesterday, denies trauma.  Likely patient fell and just does not have memory of it.  Obtain CT Chest & CT Abdomen/Pelvis.     Primary Hypertension: well controlled.   Continue Lopressor, amlodipine.  Titrate medications as needed.     History of alcohol abuse  Seizure precautions.  Continue thiamine, folic acid.     Elevated TSH: Recommend repeat testing in 6 weeks as outpatient.    Disposition: Likely discharge home in AM.    Code status:   Code Status (Patient has no pulse and is not breathing): CPR (Attempt to Resuscitate)  Medical Interventions (Patient has pulse or is breathing): Full Support       Nutrition: Diet: Regular/House Diet, Fluid Restriction (240 mL/tray) Diets; 1500 mL/day; Texture: Regular Texture (IDDSI 7); Fluid Consistency: Thin (IDDSI 0) Patient isn't on Tube Feeding     DVT prophylaxis:  Mechanical DVT prophylaxis orders are present.     Subjective / Review of systems     Review of Systems   Constitutional:  Positive for activity change, chills, fatigue and fever.   HENT:  Positive for congestion and rhinorrhea. Negative for sore throat.    Respiratory:  Positive for cough. Negative for chest tightness, shortness of breath and wheezing.    Gastrointestinal:  Negative for abdominal pain, nausea and vomiting.   Endocrine: Negative for cold intolerance and heat intolerance.   Genitourinary:  Negative for dysuria and flank pain.    Musculoskeletal:  Negative for arthralgias and back pain.   Skin:         Large bruise appeared on left flank area.   Neurological:  Negative for dizziness and light-headedness.   Psychiatric/Behavioral:  Negative for confusion. The patient is not nervous/anxious.      Objective / Physical Exam     Vital signs:  Temp: 98.5 °F (36.9 °C)  BP: 139/86  Heart Rate: 57  Resp: 17  SpO2: 100 %  Weight: 94.8 kg (208 lb 15.9 oz)    Admission Weight: Weight: 96.5 kg (212 lb 11.9 oz)  Current Weight: Weight: 94.8 kg (208 lb 15.9 oz)    Input/Output in last 24 hours:    Intake/Output Summary (Last 24 hours) at 12/29/2023 1322  Last data filed at 12/29/2023 0554  Gross per 24 hour   Intake 1143 ml   Output 1950 ml   Net -807 ml      Net IO Since Admission: -3,348 mL [12/29/23 1322]     Physical Exam  Vitals and nursing note reviewed.   Constitutional:       General: He is not in acute distress.     Appearance: Normal appearance. He is not ill-appearing, toxic-appearing or diaphoretic.   HENT:      Head: Normocephalic and atraumatic.      Nose: Nose normal. No congestion.      Mouth/Throat:      Mouth: Mucous membranes are moist.      Pharynx: Oropharynx is clear. No oropharyngeal exudate.   Eyes:      General: No scleral icterus.     Extraocular Movements: Extraocular movements intact.      Conjunctiva/sclera: Conjunctivae normal.      Pupils: Pupils are equal, round, and reactive to light.   Cardiovascular:      Rate and Rhythm: Normal rate and regular rhythm.      Pulses: Normal pulses.      Heart sounds: Normal heart sounds. No murmur heard.  Pulmonary:      Effort: Pulmonary effort is normal. No respiratory distress.      Breath sounds: Wheezing and rhonchi present. No rales.   Abdominal:      General: Bowel sounds are normal. There is no distension.      Palpations: Abdomen is soft.      Tenderness: There is no abdominal tenderness. There is no guarding or rebound.   Musculoskeletal:         General: No swelling. Normal  range of motion.      Cervical back: Neck supple.      Right lower leg: No edema.      Left lower leg: No edema.   Skin:     General: Skin is warm and dry.      Findings: Bruising (Left side, approximately 4 cm x 5 cm.) present.   Neurological:      General: No focal deficit present.      Mental Status: He is alert and oriented to person, place, and time. Mental status is at baseline.   Psychiatric:         Mood and Affect: Mood normal.         Behavior: Behavior normal.         Thought Content: Thought content normal.         Judgment: Judgment normal.     Radiology and Labs     Results from last 7 days   Lab Units 12/26/23  2322 12/26/23  1820 12/26/23  1624   WBC 10*3/mm3 4.80  --  6.80   HEMOGLOBIN g/dL 15.1  --  17.1   HEMOGLOBIN, POC g/dL  --  15.3  --    HEMATOCRIT % 41.7  --  47.4   HEMATOCRIT POC %  --  45  --    PLATELETS 10*3/mm3 177  --  222      Results from last 7 days   Lab Units 12/26/23  1707   ALK PHOS U/L 123*   AST (SGOT) U/L 112*   ALT (SGPT) U/L 95*           Results from last 7 days   Lab Units 12/29/23  1147 12/29/23  0808 12/29/23  0539 12/29/23  0039 12/28/23  2020 12/28/23  1825 12/28/23  1335 12/28/23  0427 12/27/23  2211   SODIUM mmol/L 126* 139 126* 125* 123* 121* 118*   < > 119*   POTASSIUM mmol/L  --  4.2  --  4.7 4.0 3.9 3.6  --  4.1   CHLORIDE mmol/L  --  106  --   --  89* 85* 83*  --  83*   CO2 mmol/L  --  24.0  --   --  26.0 25.0 25.0  --  27.0   BUN mg/dL  --  8  --   --  10 8 9  --  10   CREATININE mg/dL  --  0.59*  --   --  0.83 0.86 0.85  --  0.81   GLUCOSE mg/dL  --  97  --   --  108* 146* 127*  --  108*    < > = values in this interval not displayed.        Imaging Results (Last 24 Hours)       ** No results found for the last 24 hours. **             Current medications     Scheduled Meds:   amLODIPine, 5 mg, Oral, Q24H  folic acid, 1 mg, Oral, Daily  metoprolol tartrate, 25 mg, Oral, Q12H  sodium chloride, 10 mL, Intravenous, Q12H  sodium chloride, 10 mL, Intravenous,  Q12H  sodium chloride, 1 g, Oral, TID With Meals  thiamine (B-1) IV, 200 mg, Intravenous, Q8H   Followed by  [START ON 1/1/2024] thiamine, 100 mg, Oral, Daily        Continuous Infusions:          Plan discussed with RN. Reviewed all other data in the last 24 hours, including but not limited to vitals, labs, microbiology, imaging and pertinent notes from other providers.  Plan also discussed with patient and spouse at the bedside.      LAUREANO Silverio   Hospitalist Service  12/29/23   13:22 EST

## 2023-12-29 NOTE — CASE MANAGEMENT/SOCIAL WORK
Continued Stay Note   Greg     Patient Name: Manoj Cleary  MRN: [id number]  Today's Date: 12/29/2023    Admit Date: 12/26/2023    Plan: DC Plan: Anticipate routine home with spouse. New PCP appt on AVS. CSW following for ETOH use.   Discharge Plan       Row Name 12/29/23 1535       Plan    Plan DC Plan: Anticipate routine home with spouse. New PCP appt on AVS. CSW following for ETOH use.    Provided Post Acute Provider List? N/A    Provided Post Acute Provider Quality & Resource List? N/A    Plan Comments CM reviewed chart documentation to obtain clinical updates. CM spoke with intensivist, nursing, and NP to obtain clinical upates in morning rounds. Patient downgraded to Medical bed. CM will continue to follow for any further needs and adjust discharge plan accordingly. DC Barriers: O2@4L nc, PICC, IV Thiamine, monitor sodium levels (Hyponatremia resolved this morning).               Expected Discharge Date and Time       Expected Discharge Date Expected Discharge Time    Dec 30, 2023               Ofelia Lockwood RN    Office Phone: [phone]  Office Cell:     [phone];

## 2023-12-29 NOTE — PROGRESS NOTES
Critical Care Progress Note   Manoj Cleary : 1965 MRN:[id number] LOS:2     Principal Problem: Hyponatremia     Reason for follow up: All the medical problems listed below    Summary     A 58 y.o. male admitted with a principal diagnosis of Hyponatremia.      Significant events     23 : Afeb, VSS.  Has had 2.7 L UOP last 24h; net -3.3 L.  Na up to 126.  D/C 3% saline.  Continue fluid restriction.  Get CT A/P to eval the bruising to make sure nothing significant happening.  Ok to transfer to floor.    Assessment / Plan     Severe Hyponatremia  Nephrology following  Q2h neuro checks  3% NS ordered per Neph, labs per protocol  Urine sodium, TSH, Serum osmolality pending  Alcohol history documented as 20 drinks (beers)/week -- Last drink 23  Monitor I/O  1.5 L fluid restriction       Influenza A  Supportive care -- Tessalon pearls, tylenol, Zofran for nausea   D/C Tamiflu as pt is out of range for it to help       Essential hypertension -- Not on home medication  Patient given a one time dose of Norvasc 10 mg  Lopressor 25 mg BID     Elevated LFTs  Monitor and trend       L flank ecchymosis  Slightly bigger today  Check CT A/P      Hypokalemia -- Replacing        Dispo:  Transfer to med-surg      Code status:   Code Status (Patient has no pulse and is not breathing): CPR (Attempt to Resuscitate)  Medical Interventions (Patient has pulse or is breathing): Full Support       Nutrition: Diet: Regular/House Diet, Fluid Restriction (240 mL/tray) Diets; 1500 mL/day; Texture: Regular Texture (IDDSI 7); Fluid Consistency: Thin (IDDSI 0)   Patient isn't on Tube Feeding    DVT prophylaxis:  Mechanical DVT prophylaxis orders are present.     Subjective / Review of systems     Review of Systems   Pt complains of pain in left side with coughing and coughing with deep breaths.  Denies f/c/s/n/v/d.  Denies SOB.    Objective / Physical Exam   Vital signs:  Temp: 98.7 °F (37.1 °C)  BP: 151/94  Heart Rate: 57  Resp:  15  SpO2: 100 %  Weight: 94.8 kg (208 lb 15.9 oz)    Admission Weight: Weight: 96.5 kg (212 lb 11.9 oz)  Current Weight: Weight: 94.8 kg (208 lb 15.9 oz)    Input/Output in last 24 hours:    Intake/Output Summary (Last 24 hours) at 12/29/2023 0844  Last data filed at 12/29/2023 0554  Gross per 24 hour   Intake 1383 ml   Output 2450 ml   Net -1067 ml      Physical Exam     GEN:  Pleasant.  Appears appropriate for stated age.  WD/WN/WH.  Sitting up in bedside chair on RA.  NAD.  NEURO:  Brainstem reflexes intact.  No obvious focal deficit.  Moves all 4 ext.  HEENT:  N/AT.  PERRL.  MMM.  Oropharynx non-erythematous.  No drainage from the eyes/ears/nose.  No conjunctival petechiae.  No oral thrush.  Auditory and visual acuity grossly wnl.  Good dentition.  Voice normal.  NECK:  Supple, NT, trachea midline.  No meningismus.  No ROM limitation.  No torticollis.  No JVD.  No thyromegaly.    CHEST/LUNGS:  Breath sounds are noted to be slightly coarse in the left base (minimally), no rales or wheezing.  Chest excursion equal bilaterally.    CARDIOVASCULAR:  RRR w/o murmur noted.   GI:  Abdomen soft, NT, ND, +BS.  No HSM.  :  No walsh cath in place.  EXTREMITIES:  No deformity or amputation.  No cyanosis, edema, or asymmetry.  Pulses 2+ and equal in BLE's.  Some venous stasis skin changes noted.  SKIN:  Warm, dry, and pink.  No rash, breakdown, or track marks noted. Pt noted to have a large bruise on left upper flank--bigger than yesterday by about 2 cm.  LYMPHATICS/HEME:  No overt LAD.  No lymphedema.  MSK:  Normal ROM.  No joint abnormalities noted.  Strength is 5/5 and equal in BUE and BLE's.  PSYCH:  Pleasant.  A&Ox 3.  Normal mood and affect.  Responds appropriately to commands and appears to comprehend instructions.        Radiology and Labs     Results from last 7 days   Lab Units 12/26/23  2322 12/26/23  1820 12/26/23  1624   WBC 10*3/mm3 4.80  --  6.80   HEMATOCRIT % 41.7  --  47.4   HEMATOCRIT POC %  --  45  --     PLATELETS 10*3/mm3 177  --  222      Results from last 7 days   Lab Units 12/29/23  0539 12/29/23  0039 12/28/23  2020 12/28/23  1825 12/28/23  1335 12/28/23  0427 12/27/23  2211 12/27/23  1559 12/27/23  1015   SODIUM mmol/L 126* 125* 123* 121* 118*   < > 119*   < > 112*   POTASSIUM mmol/L  --  4.7 4.0 3.9 3.6  --  4.1  --  3.7   CHLORIDE mmol/L  --   --  89* 85* 83*  --  83*  --  75*   CO2 mmol/L  --   --  26.0 25.0 25.0  --  27.0  --  23.0   BUN mg/dL  --   --  10 8 9  --  10  --  8   CREATININE mg/dL  --   --  0.83 0.86 0.85  --  0.81  --  0.71*    < > = values in this interval not displayed.      Current medications   Scheduled Meds: amLODIPine, 5 mg, Oral, Q24H  folic acid, 1 mg, Oral, Daily  metoprolol tartrate, 25 mg, Oral, Q12H  oxymetazoline, 2 spray, Each Nare, BID  sodium chloride, 10 mL, Intravenous, Q12H  sodium chloride, 10 mL, Intravenous, Q12H  thiamine (B-1) IV, 200 mg, Intravenous, Q8H   Followed by  [START ON 1/1/2024] thiamine, 100 mg, Oral, Daily      Continuous Infusions:      Plan discussed with RN. Reviewed all other data in the last 24 hours, including but not limited to vitals, labs, microbiology, imaging and pertinent notes from other providers.     Uche Ho, DO   Critical Care  12/29/23   08:44 EST

## 2023-12-29 NOTE — PROGRESS NOTES
[facility] Progress Note      Patient Name: Manoj Cleary  : 1965  MRN: [id number]  Primary Care Physician:  Provider, No Known  Date of admission: 2023    Subjective     Interval History:   Feels well    Review of Systems:   14 point review of systems is otherwise negative except for mentioned above on HPI    Objective     Vitals:   Temp:  [97.9 °F (36.6 °C)-98.7 °F (37.1 °C)] 98.5 °F (36.9 °C)  Heart Rate:  [57-71] 57  Resp:  [9-17] 17  BP: (125-151)/() 139/86  Flow (L/min):  [2-4] 4    Intake/Output Summary (Last 24 hours) at 2023 1135  Last data filed at 2023 0554  Gross per 24 hour   Intake 1383 ml   Output 1950 ml   Net -567 ml       Physical Exam:    General Appearance: alert, oriented x 3, no acute distress   Skin: warm and dry  HEENT: oral mucosa normal, nonicteric sclera  Neck: supple, no JVD  Lungs: CTA  Heart: RRR, normal S1 and S2  Abdomen: soft, nontender, nondistended  : no palpable bladder  Extremities: no edema, cyanosis or clubbing  Neuro: normal speech and mental status     Scheduled Meds:     amLODIPine, 5 mg, Oral, Q24H  folic acid, 1 mg, Oral, Daily  metoprolol tartrate, 25 mg, Oral, Q12H  sodium chloride, 10 mL, Intravenous, Q12H  sodium chloride, 10 mL, Intravenous, Q12H  thiamine (B-1) IV, 200 mg, Intravenous, Q8H   Followed by  [START ON 2024] thiamine, 100 mg, Oral, Daily      IV Meds:        Results Reviewed:   I have personally reviewed the results from the time of this admission to 2023 11:35 EST     Results from last 7 days   Lab Units 23  0808 23  0539 23  0039 23  1825 23  1820 23  1707   SODIUM mmol/L 139 126* 125* 123* 121*   < > 111*   POTASSIUM mmol/L 4.2  --  4.7 4.0 3.9   < > 3.1*   CHLORIDE mmol/L 106  --   --  89* 85*   < > 68*   CO2 mmol/L 24.0  --   --  26.0 25.0   < > 28.0   BUN mg/dL 8  --   --  10 8   < > 7   CREATININE mg/dL 0.59*  --   --  0.83  0.86   < > 0.70*   CALCIUM mg/dL 7.8*  --   --  8.5* 8.7   < > 8.9   BILIRUBIN mg/dL  --   --   --   --   --   --  0.6   ALK PHOS U/L  --   --   --   --   --   --  123*   ALT (SGPT) U/L  --   --   --   --   --   --  95*   AST (SGOT) U/L  --   --   --   --   --   --  112*   GLUCOSE mg/dL 97  --   --  108* 146*   < > 134*    < > = values in this interval not displayed.     Estimated Creatinine Clearance: 153.1 mL/min (A) (by C-G formula based on SCr of 0.59 mg/dL (L)).  Results from last 7 days   Lab Units 12/27/23  1015   MAGNESIUM mg/dL 1.8         Results from last 7 days   Lab Units 12/26/23  2322 12/26/23  1820 12/26/23  1624   WBC 10*3/mm3 4.80  --  6.80   HEMOGLOBIN g/dL 15.1  --  17.1   HEMOGLOBIN, POC g/dL  --  15.3  --    PLATELETS 10*3/mm3 177  --  222           Assessment / Plan     ASSESSMENT:  Hyponatremia-resolved, was due to excessive alcohol intake and decreased intake of nutrition  2.   Benign essential hypertension better  3.   Influenza a     PLAN:  Advised complete alcohol abstinence, he agrees  Will sign off but remain available       Thank you for involving us in the care of Manoj Cleary.  Please feel free to call with any questions.    Matthew Morris MD  12/29/23  11:35 EST    [facility]  [phone]

## 2023-12-30 ENCOUNTER — READMISSION MANAGEMENT (OUTPATIENT)
Dept: CALL CENTER | Facility: HOSPITAL | Age: 58
End: 2023-12-30
Payer: OTHER GOVERNMENT

## 2023-12-30 VITALS
HEART RATE: 83 BPM | BODY MASS INDEX: 30.16 KG/M2 | DIASTOLIC BLOOD PRESSURE: 93 MMHG | TEMPERATURE: 97.8 F | RESPIRATION RATE: 13 BRPM | WEIGHT: 203.6 LBS | SYSTOLIC BLOOD PRESSURE: 150 MMHG | HEIGHT: 69 IN | OXYGEN SATURATION: 97 %

## 2023-12-30 LAB
ANION GAP SERPL CALCULATED.3IONS-SCNC: 7 MMOL/L (ref 5–15)
BASOPHILS # BLD AUTO: 0.1 10*3/MM3 (ref 0–0.2)
BASOPHILS NFR BLD AUTO: 0.8 % (ref 0–1.5)
BUN SERPL-MCNC: 8 MG/DL (ref 6–20)
BUN/CREAT SERPL: 12.5 (ref 7–25)
CALCIUM SPEC-SCNC: 8.8 MG/DL (ref 8.6–10.5)
CHLORIDE SERPL-SCNC: 94 MMOL/L (ref 98–107)
CO2 SERPL-SCNC: 26 MMOL/L (ref 22–29)
CREAT SERPL-MCNC: 0.64 MG/DL (ref 0.76–1.27)
DEPRECATED RDW RBC AUTO: 42.9 FL (ref 37–54)
EGFRCR SERPLBLD CKD-EPI 2021: 109.1 ML/MIN/1.73
EOSINOPHIL # BLD AUTO: 0.2 10*3/MM3 (ref 0–0.4)
EOSINOPHIL NFR BLD AUTO: 2.8 % (ref 0.3–6.2)
ERYTHROCYTE [DISTWIDTH] IN BLOOD BY AUTOMATED COUNT: 12.6 % (ref 12.3–15.4)
GLUCOSE SERPL-MCNC: 99 MG/DL (ref 65–99)
HCT VFR BLD AUTO: 37.4 % (ref 37.5–51)
HGB BLD-MCNC: 13 G/DL (ref 13–17.7)
LYMPHOCYTES # BLD AUTO: 1.4 10*3/MM3 (ref 0.7–3.1)
LYMPHOCYTES NFR BLD AUTO: 20.6 % (ref 19.6–45.3)
MCH RBC QN AUTO: 34.3 PG (ref 26.6–33)
MCHC RBC AUTO-ENTMCNC: 34.8 G/DL (ref 31.5–35.7)
MCV RBC AUTO: 98.5 FL (ref 79–97)
MONOCYTES # BLD AUTO: 0.6 10*3/MM3 (ref 0.1–0.9)
MONOCYTES NFR BLD AUTO: 9.5 % (ref 5–12)
NEUTROPHILS NFR BLD AUTO: 4.5 10*3/MM3 (ref 1.7–7)
NEUTROPHILS NFR BLD AUTO: 66.3 % (ref 42.7–76)
NRBC BLD AUTO-RTO: 0.1 /100 WBC (ref 0–0.2)
OSMOLALITY SERPL: 263 MOSM/KG (ref 275–295)
OSMOLALITY SERPL: 268 MOSM/KG (ref 275–295)
OSMOLALITY SERPL: 268 MOSM/KG (ref 275–295)
PLATELET # BLD AUTO: 259 10*3/MM3 (ref 140–450)
PMV BLD AUTO: 7.4 FL (ref 6–12)
POTASSIUM SERPL-SCNC: 4.4 MMOL/L (ref 3.5–5.2)
RBC # BLD AUTO: 3.8 10*6/MM3 (ref 4.14–5.8)
SODIUM SERPL-SCNC: 125 MMOL/L (ref 136–145)
SODIUM SERPL-SCNC: 126 MMOL/L (ref 136–145)
SODIUM SERPL-SCNC: 127 MMOL/L (ref 136–145)
SODIUM SERPL-SCNC: 128 MMOL/L (ref 136–145)
WBC NRBC COR # BLD AUTO: 6.8 10*3/MM3 (ref 3.4–10.8)

## 2023-12-30 PROCEDURE — 83930 ASSAY OF BLOOD OSMOLALITY: CPT | Performed by: INTERNAL MEDICINE

## 2023-12-30 PROCEDURE — 25010000002 THIAMINE HCL 200 MG/2ML SOLUTION

## 2023-12-30 PROCEDURE — 85025 COMPLETE CBC W/AUTO DIFF WBC: CPT | Performed by: FAMILY MEDICINE

## 2023-12-30 PROCEDURE — 84295 ASSAY OF SERUM SODIUM: CPT | Performed by: INTERNAL MEDICINE

## 2023-12-30 PROCEDURE — 80048 BASIC METABOLIC PNL TOTAL CA: CPT | Performed by: INTERNAL MEDICINE

## 2023-12-30 RX ORDER — FOLIC ACID 1 MG/1
1 TABLET ORAL DAILY
Qty: 30 TABLET | Refills: 0 | Status: SHIPPED | OUTPATIENT
Start: 2023-12-31

## 2023-12-30 RX ORDER — AMLODIPINE BESYLATE 5 MG/1
5 TABLET ORAL
Qty: 30 TABLET | Refills: 0 | Status: SHIPPED | OUTPATIENT
Start: 2023-12-31

## 2023-12-30 RX ADMIN — DOCUSATE SODIUM 100 MG: 100 CAPSULE, LIQUID FILLED ORAL at 09:39

## 2023-12-30 RX ADMIN — METOPROLOL TARTRATE 25 MG: 25 TABLET, FILM COATED ORAL at 09:39

## 2023-12-30 RX ADMIN — SODIUM CHLORIDE 1 G: 1 TABLET ORAL at 11:54

## 2023-12-30 RX ADMIN — SODIUM CHLORIDE 1 G: 1 TABLET ORAL at 09:39

## 2023-12-30 RX ADMIN — FOLIC ACID 1 MG: 1 TABLET ORAL at 09:39

## 2023-12-30 RX ADMIN — THIAMINE HYDROCHLORIDE 200 MG: 100 INJECTION, SOLUTION INTRAMUSCULAR; INTRAVENOUS at 05:48

## 2023-12-30 RX ADMIN — THIAMINE HYDROCHLORIDE 200 MG: 100 INJECTION, SOLUTION INTRAMUSCULAR; INTRAVENOUS at 14:21

## 2023-12-30 RX ADMIN — Medication 10 ML: at 09:42

## 2023-12-30 RX ADMIN — AMLODIPINE BESYLATE 5 MG: 5 TABLET ORAL at 09:39

## 2023-12-30 RX ADMIN — POLYETHYLENE GLYCOL 3350 17 G: 17 POWDER, FOR SOLUTION ORAL at 09:39

## 2023-12-30 RX ADMIN — HYDROCODONE BITARTRATE AND ACETAMINOPHEN 1 TABLET: 5; 325 TABLET ORAL at 04:52

## 2023-12-30 NOTE — OUTREACH NOTE
Prep Survey      Flowsheet Row Responses   [facility] from? [facility]   Is LACE score < 7 ? No   Eligibility [facility]   [facility]   Date of Admission 12/26/23   Date of Discharge 12/30/23   Discharge Disposition Home or Self Care   Discharge diagnosis Hyponatremia   Does the patient have one of the following disease processes/diagnoses(primary or secondary)? Other   Does the patient have Home health ordered? No   Is there a DME ordered? No   Comments regarding appointments New PCP appt   Prep survey completed? Yes            GISELL YANG - Registered Nurse

## 2023-12-30 NOTE — DISCHARGE SUMMARY
[facility] Medicine Services  Discharge Summary    Date of Service: 2023  Patient Name: Manoj Cleary  : 1965  MRN: [id number]    Date of Admission: 2023  Discharge Diagnosis: hyponatremia  Date of Discharge:  2023  Primary Care Physician: Provider, No Known      Presenting Problem:   Secondary hypertension [I15.9]  Hyponatremia [E87.1]  Influenza A [J10.1]    Active and Resolved Hospital Problems:  Active Hospital Problems    Diagnosis POA    **Hyponatremia [E87.1] Yes    Hypokalemia [E87.6] Yes    Influenza A [J10.1] Yes    Elevated LFTs [R79.89] Yes    Obesity (BMI 30-39.9) [E66.9] Yes    Essential hypertension [I10] Yes      Resolved Hospital Problems   No resolved problems to display.         Hospital Course         Hospital Course:     58 y.o. male with a CMH of hypertension and obesity  who presented to [facility] on 2023 with  flu -like symptoms since last Thursday.  He reports nasal congestion and paroxysmal coughing spells with rib pain secondary to the coughing spells.  He denies any shortness of breath, nausea vomiting diarrhea.  He is currently afebrile.  His wife at bedside reports she went to urgent care and tested for flu a as well as their son.  Patient reports he declined to be tested.  He does however test positive for influenza A today.  He is stable on room air.  Blood pressure was elevated on admission.  He reports he does not see a primary care provider and had high blood pressure in the past on metoprolol however he has not been taking any medications for quite a while.  He may likely have been discharged however his labs came back with a critical sodium of 108.  He reports both his son and he have had low sodium in the past although they do not know how low.  He does report he drinks at least 2 beers a day sometimes more.  Other labs show a potassium of 3.1 chloride of 73 0.60 BUN 6 glucose 143 ionized calcium 0.95,  ALT  95.  WBC and hemoglobin are normal.  Chest x-ray per radiology shows no acute abnormality.  EKG with no others for comparison shows sinus rhythm heart rate 80 prolonged SC interval incomplete left bundle branch block.  He was started on normal saline 125 cc/h and potassium replacement protocol will be put in place.  Supportive care for influenza A.  Nephrology has been consulted for hyponatremia   Patient was kept in hospital and sodium level slowly improved with fluid restriction.  Supportive care was given for influenza and his breathing also improved.  Patient was then discharged home.           DISCHARGE Follow Up Recommendations for labs and diagnostics: followup sodium level with pcp      Reasons For Change In Medications and Indications for New Medications:      Day of Discharge     Vital Signs:  Temp:  [98 °F (36.7 °C)-98.9 °F (37.2 °C)] 98 °F (36.7 °C)  Heart Rate:  [61-89] 70  Resp:  [10-19] 17  BP: (133-160)/(79-96) 155/90  Flow (L/min):  [4] 4    Physical Exam:  Physical Exam  Vitals and nursing note reviewed.   Constitutional:       General: He is not in acute distress.     Appearance: He is well-developed. He is not diaphoretic.   HENT:      Head: Normocephalic and atraumatic.   Cardiovascular:      Rate and Rhythm: Normal rate.   Pulmonary:      Effort: Pulmonary effort is normal. No respiratory distress.      Breath sounds: No wheezing.   Abdominal:      General: There is no distension.      Palpations: Abdomen is soft.   Musculoskeletal:         General: Normal range of motion.   Skin:     General: Skin is warm and dry.   Neurological:      Mental Status: He is alert.      Cranial Nerves: No cranial nerve deficit.   Psychiatric:         Behavior: Behavior normal.         Thought Content: Thought content normal.         Judgment: Judgment normal.              Pertinent  and/or Most Recent Results     LAB RESULTS:      Lab 12/29/23  1344 12/26/23  2322 12/26/23  1820 12/26/23  1624   WBC 7.00 4.80  --   6.80   HEMOGLOBIN 13.3 15.1  --  17.1   HEMOGLOBIN, POC  --   --  15.3  --    HEMATOCRIT 38.4 41.7  --  47.4   HEMATOCRIT POC  --   --  45  --    PLATELETS 206 177  --  222   NEUTROS ABS 5.00 4.10  --  5.90   LYMPHS ABS 1.50 0.50*  --  0.50*   MONOS ABS 0.40 0.20  --  0.40   EOS ABS 0.10 0.00  --  0.00   MCV 98.3* 91.8  --  94.8         Lab 12/30/23  0858 12/30/23  0547 12/29/23  2328 12/29/23  1958 12/29/23  1757 12/29/23  1147 12/29/23  0808 12/29/23  0539 12/29/23  0039 12/28/23  2020 12/28/23  1825 12/28/23  0427 12/27/23  2211 12/27/23  1559 12/27/23  1015 12/26/23  2322   SODIUM 127* 128* 125* 127* 127*   < > 139   < > 125* 123* 121*   < > 119*   < > 112* 108*   POTASSIUM 4.4  --   --  4.3  --   --  4.2  --  4.7 4.0 3.9   < > 4.1  --  3.7 3.6   CHLORIDE 94*  --   --  92*  --   --  106  --   --  89* 85*   < > 83*  --  75* 69*   CO2 26.0  --   --  25.0  --   --  24.0  --   --  26.0 25.0   < > 27.0  --  23.0 25.0   ANION GAP 7.0  --   --  10.0  --   --  9.0  --   --  8.0 11.0   < > 9.0  --  14.0 14.0   BUN 8  --   --  10  --   --  8  --   --  10 8   < > 10  --  8 7   CREATININE 0.64*  --   --  0.67*  --   --  0.59*  --   --  0.83 0.86   < > 0.81  --  0.71* 0.58*   EGFR 109.1  --   --  107.6  --   --  111.8  --   --  100.8 99.7   < > 101.6  --  105.7 112.3   GLUCOSE 99  --   --  131*  --   --  97  --   --  108* 146*   < > 108*  --  122* 161*   CALCIUM 8.8  --   --  8.4*  --   --  7.8*  --   --  8.5* 8.7   < > 8.7  --  8.8 8.6   MAGNESIUM  --   --   --   --   --   --   --   --   --   --   --   --   --   --  1.8  --    HEMOGLOBIN A1C  --   --   --   --   --   --   --   --   --   --   --   --   --   --   --  5.80*   TSH  --   --   --   --   --   --   --   --   --   --   --   --  5.120*  --   --   --     < > = values in this interval not displayed.         Lab 12/26/23  1707   TOTAL PROTEIN 7.3   ALBUMIN 4.3   GLOBULIN 3.0   ALT (SGPT) 95*   AST (SGOT) 112*   BILIRUBIN 0.6   ALK PHOS 123*         Lab 12/28/23  0527  12/27/23  1015 12/26/23  1707 12/26/23  1624   PROBNP <36.0 163.1  --  105.7   HSTROP T  --   --  9  --                  Brief Urine Lab Results       None          Microbiology Results (last 10 days)       Procedure Component Value - Date/Time    Respiratory Panel PCR w/COVID-19(SARS-CoV-2) [facility]AN/[facility]/COR/KYLA[facility] In-House, NP Swab in UTM/VTM, 2 HR TAT - Swab, Nasopharynx [[id number]]  (Abnormal) Collected: 12/26/23 1624    Lab Status: Final result Specimen: Swab from Nasopharynx Updated: 12/26/23 1738     ADENOVIRUS, PCR Not Detected     Coronavirus 229E Not Detected     Coronavirus HKU1 Not Detected     Coronavirus NL63 Not Detected     Coronavirus OC43 Not Detected     COVID19 Not Detected     Human Metapneumovirus Not Detected     Human Rhinovirus/Enterovirus Not Detected     Influenza A H1 2009 PCR Detected     Influenza B PCR Not Detected     Parainfluenza Virus 1 Not Detected     Parainfluenza Virus 2 Not Detected     Parainfluenza Virus 3 Not Detected     Parainfluenza Virus 4 Not Detected     RSV, PCR Not Detected     Bordetella pertussis pcr Not Detected     Bordetella parapertussis PCR Not Detected     Chlamydophila pneumoniae PCR Not Detected     Mycoplasma pneumo by PCR Not Detected    Narrative:      In the setting of a positive respiratory panel with a viral infection PLUS a negative procalcitonin without other underlying concern for bacterial infection, consider observing off antibiotics or discontinuation of antibiotics and continue supportive care. If the respiratory panel is positive for atypical bacterial infection (Bordetella pertussis, Chlamydophila pneumoniae, or Mycoplasma pneumoniae), consider antibiotic de-escalation to target atypical bacterial infection.            CT Chest Without Contrast Diagnostic    Result Date: 12/29/2023  Impression: Impression: 1.Multifocal pulmonary infectious changes with imaging features suggestive of COVID-19. Correlate with history. No lobar consolidation.  2.Left abdominal wall hematoma with subcutaneous edema/ecchymosis. Electronically Signed: Ari Rubi MD  12/29/2023 1:14 PM CST  Workstation ID: [id number]    CT Abdomen Pelvis Without Contrast    Result Date: 12/29/2023  Impression: Impression: 1.Multifocal pulmonary infectious changes with imaging features suggestive of COVID-19. Correlate with history. No lobar consolidation. 2.Left abdominal wall hematoma with subcutaneous edema/ecchymosis. Electronically Signed: Ari Rubi MD  12/29/2023 1:14 PM CST  Workstation ID: [id number]    XR Chest 2 View    Result Date: 12/26/2023  Impression: Impression: Cardiomegaly. No acute cardiopulmonary abnormality. Electronically Signed: Madai Matta MD  12/26/2023 5:35 PM EST  Workstation ID: [id number]                 Labs Pending at Discharge:      Procedures Performed           Consults:   Consults       Date and Time Order Name Status Description    12/29/2023  8:31 AM Inpatient Hospitalist Consult      12/26/2023  7:21 PM Inpatient Nephrology Consult      12/26/2023  6:23 PM Hospitalist (on-call MD unless specified)                Discharge Details        Discharge Medications        New Medications        Instructions Start Date   amLODIPine 5 MG tablet  Commonly known as: NORVASC   5 mg, Oral, Every 24 Hours Scheduled   Start Date: December 31, 2023     folic acid 1 MG tablet  Commonly known as: FOLVITE   1 mg, Oral, Daily   Start Date: December 31, 2023     metoprolol tartrate 25 MG tablet  Commonly known as: LOPRESSOR   25 mg, Oral, Every 12 Hours Scheduled             Continue These Medications        Instructions Start Date   aspirin 81 MG EC tablet   81 mg, Oral, Daily      cetirizine 10 MG tablet  Commonly known as: zyrTEC   10 mg, Oral, Daily      omeprazole 20 MG capsule  Commonly known as: priLOSEC   20 mg, Oral, Daily               No Known Allergies      Discharge Disposition:   Home or Self Care    Diet:  Hospital:  Diet Order   Procedures    Diet:  Regular/House Diet, Fluid Restriction (240 mL/tray) Diets; Other (Specify mL/day) (1200mL/day); Texture: Regular Texture (IDDSI 7); Fluid Consistency: Thin (IDDSI 0)         Discharge Activity:   Activity Instructions       Activity as Tolerated                CODE STATUS:  Code Status and Medical Interventions:   Ordered at: 12/26/23 1918     Code Status (Patient has no pulse and is not breathing):    CPR (Attempt to Resuscitate)     Medical Interventions (Patient has pulse or is breathing):    Full Support         Future Appointments   Date Time Provider Department Center   1/3/2024  8:00 AM Zay Browne APRN [facility]       Additional Instructions for the Follow-ups that You Need to Schedule       Discharge Follow-up with PCP   As directed       Currently Documented PCP:    Provider, No Known    PCP Phone Number:    None     Follow Up Details: 1 week        Discharge Follow-up with Specified Provider: nephrology; 1 Week   As directed      To: nephrology   Follow Up: 1 Week   Follow Up Details: hyponatremia                Time spent on Discharge including face to face service:  >35 minutes    Signature: Electronically signed by Cas Brown MD, 12/30/23, 10:49 EST.  [facility], [place], operated by [facility] Hospitalist Team

## 2023-12-31 NOTE — CASE MANAGEMENT/SOCIAL WORK
Case Management Discharge Note      Final Note: Home with spouse    Transportation Services  Private: Car    Final Discharge Disposition Code: 01 - home or self-care

## 2024-01-02 ENCOUNTER — TRANSITIONAL CARE MANAGEMENT TELEPHONE ENCOUNTER (OUTPATIENT)
Dept: CALL CENTER | Facility: HOSPITAL | Age: 59
End: 2024-01-02
Payer: OTHER GOVERNMENT

## 2024-01-02 NOTE — OUTREACH NOTE
Call Center TCM Note      Flowsheet Row Responses   [facility] [place] [facility] from? [facility]   Does the patient have one of the following disease processes/diagnoses(primary or secondary)? Other   TCM attempt successful? No   Unsuccessful attempts Attempt 2            Kiley Matos LPN    1/2/2024, 15:18 EST

## 2024-01-03 ENCOUNTER — TRANSITIONAL CARE MANAGEMENT TELEPHONE ENCOUNTER (OUTPATIENT)
Dept: CALL CENTER | Facility: HOSPITAL | Age: 59
End: 2024-01-03
Payer: OTHER GOVERNMENT

## 2024-01-03 ENCOUNTER — OFFICE VISIT (OUTPATIENT)
Dept: FAMILY MEDICINE CLINIC | Facility: CLINIC | Age: 59
End: 2024-01-03
Payer: OTHER GOVERNMENT

## 2024-01-03 ENCOUNTER — LAB (OUTPATIENT)
Dept: FAMILY MEDICINE CLINIC | Facility: CLINIC | Age: 59
End: 2024-01-03
Payer: OTHER GOVERNMENT

## 2024-01-03 VITALS
HEIGHT: 69 IN | OXYGEN SATURATION: 100 % | SYSTOLIC BLOOD PRESSURE: 168 MMHG | RESPIRATION RATE: 20 BRPM | WEIGHT: 207 LBS | HEART RATE: 100 BPM | BODY MASS INDEX: 30.66 KG/M2 | DIASTOLIC BLOOD PRESSURE: 92 MMHG

## 2024-01-03 DIAGNOSIS — Z09 HOSPITAL DISCHARGE FOLLOW-UP: Primary | ICD-10-CM

## 2024-01-03 DIAGNOSIS — E87.1 HYPONATREMIA: ICD-10-CM

## 2024-01-03 DIAGNOSIS — Z76.89 ENCOUNTER TO ESTABLISH CARE: ICD-10-CM

## 2024-01-03 DIAGNOSIS — Z13.220 SCREENING, LIPID: ICD-10-CM

## 2024-01-03 DIAGNOSIS — E66.9 OBESITY (BMI 30-39.9): ICD-10-CM

## 2024-01-03 DIAGNOSIS — I10 ESSENTIAL HYPERTENSION: ICD-10-CM

## 2024-01-03 LAB
ALBUMIN SERPL-MCNC: 4 G/DL (ref 3.5–5.2)
ALBUMIN/GLOB SERPL: 1.3 G/DL
ALP SERPL-CCNC: 96 U/L (ref 39–117)
ALT SERPL W P-5'-P-CCNC: 71 U/L (ref 1–41)
ANION GAP SERPL CALCULATED.3IONS-SCNC: 13.3 MMOL/L (ref 5–15)
AST SERPL-CCNC: 41 U/L (ref 1–40)
BILIRUB SERPL-MCNC: 0.8 MG/DL (ref 0–1.2)
BUN SERPL-MCNC: 7 MG/DL (ref 6–20)
BUN/CREAT SERPL: 8.1 (ref 7–25)
CALCIUM SPEC-SCNC: 9.5 MG/DL (ref 8.6–10.5)
CHLORIDE SERPL-SCNC: 94 MMOL/L (ref 98–107)
CHOLEST SERPL-MCNC: 196 MG/DL (ref 0–200)
CO2 SERPL-SCNC: 22.7 MMOL/L (ref 22–29)
CREAT SERPL-MCNC: 0.86 MG/DL (ref 0.76–1.27)
EGFRCR SERPLBLD CKD-EPI 2021: 99.7 ML/MIN/1.73
GLOBULIN UR ELPH-MCNC: 3.2 GM/DL
GLUCOSE SERPL-MCNC: 91 MG/DL (ref 65–99)
HDLC SERPL-MCNC: 40 MG/DL (ref 40–60)
LDLC SERPL CALC-MCNC: 140 MG/DL (ref 0–100)
LDLC/HDLC SERPL: 3.47 {RATIO}
POTASSIUM SERPL-SCNC: 4.4 MMOL/L (ref 3.5–5.2)
PROT SERPL-MCNC: 7.2 G/DL (ref 6–8.5)
SODIUM SERPL-SCNC: 130 MMOL/L (ref 136–145)
TRIGL SERPL-MCNC: 87 MG/DL (ref 0–150)
VLDLC SERPL-MCNC: 16 MG/DL (ref 5–40)

## 2024-01-03 PROCEDURE — 80053 COMPREHEN METABOLIC PANEL: CPT | Performed by: NURSE PRACTITIONER

## 2024-01-03 PROCEDURE — 80061 LIPID PANEL: CPT | Performed by: NURSE PRACTITIONER

## 2024-01-03 PROCEDURE — 36415 COLL VENOUS BLD VENIPUNCTURE: CPT

## 2024-01-03 RX ORDER — AMLODIPINE BESYLATE 5 MG/1
5 TABLET ORAL
Qty: 90 TABLET | Refills: 0 | Status: SHIPPED | OUTPATIENT
Start: 2024-01-03

## 2024-01-03 RX ORDER — AMLODIPINE BESYLATE 5 MG/1
5 TABLET ORAL
Qty: 90 TABLET | Refills: 0 | Status: SHIPPED | OUTPATIENT
Start: 2024-01-03 | End: 2024-01-03 | Stop reason: SDUPTHER

## 2024-01-03 NOTE — OUTREACH NOTE
Call Center TCM Note      Flowsheet Row Responses   [facility] patient discharged from? [facility]   Does the patient have one of the following disease processes/diagnoses(primary or secondary)? Other   TCM attempt successful? Yes   Discharge diagnosis Hyponatremia   TCM call completed? Yes   Wrap up additional comments Pt had appt today with PCP. TCM completed            Kiley Matos LPN    1/3/2024, 09:17 EST

## 2024-01-03 NOTE — PROGRESS NOTES
Transitional Care Follow Up Visit  Subjective     Manoj Cleary is a 58 y.o. male who presents for a transitional care management visit.    Within 48 business hours after discharge our office contacted him via telephone to coordinate his care and needs.      I reviewed and discussed the details of that call along with the discharge summary, hospital problems, inpatient lab results, inpatient diagnostic studies, and consultation reports with Manoj.     Current outpatient and discharge medications have been reconciled for the patient.  Reviewed by: LAUREANO Martinez    This patient is here today to establish care and follow up after recent hospitalization as noted below.    He has h/o HTN, obesity, and as dx at recent hospital stay: elevated LFTs, hyponatremia, hypokalemia    He tells me that he has continued to cough and has had muscle spasms in his ribs/chest with the coughing, he denies any fever.    He tells me that he has cut alcohol and cigars with intention to continue to avoid theses products.    Is not currently exercising regularly, tells me that he has not in the past couple of years, encouraged to work on establishing a routine with regular exercise    Reviewed A1C of 5.8, discussed this number in relation to diabetes, encouraged to increase physical activity, work on improving his diet    He tells me that at the hospital he was told he should have a sleep study because they had to keep waking him up for his o2 saturations dropping in the night, this has been requested today        12/30/2023     6:07 PM   Date of TCM Phone Call   [facility]   Date of Admission 12/26/2023   Date of Discharge 12/30/2023   Discharge Disposition Home or Self Care     Risk for Readmission (LACE) Score: 7 (12/30/2023  6:00 AM)    History of Present Illness   Course During Hospital Stay:  12/26-12/30 @ [facility]    58 y.o. male with a CMH of hypertension and obesity  who presented to [facility] on 12/26/2023  with  flu -like symptoms since last Thursday.  He reports nasal congestion and paroxysmal coughing spells with rib pain secondary to the coughing spells.  He denies any shortness of breath, nausea vomiting diarrhea.  He is currently afebrile.  His wife at bedside reports she went to urgent care and tested for flu a as well as their son.  Patient reports he declined to be tested.  He does however test positive for influenza A today.  He is stable on room air.  Blood pressure was elevated on admission.  He reports he does not see a primary care provider and had high blood pressure in the past on metoprolol however he has not been taking any medications for quite a while.  He may likely have been discharged however his labs came back with a critical sodium of 108.  He reports both his son and he have had low sodium in the past although they do not know how low.  He does report he drinks at least 2 beers a day sometimes more.  Other labs show a potassium of 3.1 chloride of 73 0.60 BUN 6 glucose 143 ionized calcium 0.95,  ALT 95.  WBC and hemoglobin are normal.  Chest x-ray per radiology shows no acute abnormality.  EKG with no others for comparison shows sinus rhythm heart rate 80 prolonged CO interval incomplete left bundle branch block.  He was started on normal saline 125 cc/h and potassium replacement protocol will be put in place.  Supportive care for influenza A.  Nephrology has been consulted for hyponatremia   Patient was kept in hospital and sodium level slowly improved with fluid restriction.  Supportive care was given for influenza and his breathing also improved.  Patient was then discharged home.      The following portions of the patient's history were reviewed and updated as appropriate: allergies, current medications, past family history, past medical history, past social history, past surgical history, and problem list.    Past Surgical History:   Procedure Laterality Date    HERNIA REPAIR                 Family History: family history includes Heart disease in his father. Otherwise pertinent FHx was reviewed and not pertinent to current issue.     Social History:  reports that he has been smoking cigarettes and cigars. He does not have any smokeless tobacco history on file. He reports current alcohol use of about 20.0 standard drinks of alcohol per week. He reports that he does not use drugs.     Home Medications:  Prior to Admission Medications         None       BP Readings from Last 3 Encounters:   01/03/24 168/92   12/30/23 150/93      Review of Systems   Constitutional:  Negative for appetite change and fever.        Appetite is improved   Respiratory:  Positive for cough. Negative for chest tightness, shortness of breath and wheezing.         Cough has persisted but is improving, has been productive of yellowish sputum  He has been a smoker of cigars, has not had any since this present illness   Cardiovascular: Negative.  Negative for chest pain.   Gastrointestinal:  Positive for constipation.        Has been constipated, is using miralax and colace and this is improving   Genitourinary: Negative.    Musculoskeletal:  Positive for myalgias.        Left lateral rib pain with coughing   Neurological: Negative.  Negative for dizziness and headaches.   Psychiatric/Behavioral:  Positive for sleep disturbance.         Sleep has been disrupted by coughing        Objective   Physical Exam  Vitals reviewed.   Constitutional:       Appearance: Normal appearance.   Neck:      Vascular: No carotid bruit.   Cardiovascular:      Rate and Rhythm: Normal rate and regular rhythm.      Pulses: Normal pulses.      Heart sounds: Normal heart sounds.   Pulmonary:      Effort: Pulmonary effort is normal.      Breath sounds: Normal breath sounds.   Abdominal:      General: Bowel sounds are normal.      Palpations: Abdomen is soft.   Musculoskeletal:         General: Normal range of motion.      Cervical back: Neck supple.       Right lower leg: No edema.      Left lower leg: No edema.   Skin:     General: Skin is warm.   Neurological:      Mental Status: He is alert and oriented to person, place, and time.   Psychiatric:         Mood and Affect: Mood normal.         Assessment & Plan   Problems Addressed this Visit       Hyponatremia    Relevant Orders    Comprehensive metabolic panel    Ambulatory Referral to Nephrology    Obesity (BMI 30-39.9)    Relevant Orders    Ambulatory Referral to Sleep Medicine    Essential hypertension    Relevant Medications    amLODIPine (NORVASC) 5 MG tablet    metoprolol tartrate (LOPRESSOR) 25 MG tablet    Other Relevant Orders    Ambulatory Referral to Sleep Medicine     Other Visit Diagnoses       Hospital discharge follow-up    -  Primary    Relevant Orders    Ambulatory Referral to Nephrology    Encounter to establish care        Screening, lipid        Relevant Orders    Lipid panel          Diagnoses         Codes Comments    Hospital discharge follow-up    -  Primary ICD-10-CM: Z09  ICD-9-CM: V67.59     Encounter to establish care     ICD-10-CM: Z76.89  ICD-9-CM: V65.8     Hyponatremia     ICD-10-CM: E87.1  ICD-9-CM: 276.1     Screening, lipid     ICD-10-CM: Z13.220  ICD-9-CM: V77.91     Essential hypertension     ICD-10-CM: I10  ICD-9-CM: 401.9     Obesity (BMI 30-39.9)     ICD-10-CM: E66.9  ICD-9-CM: 278.00           Is to follow up with Dr. Morris, [facility]    Monitor BP, close follow up for HTN

## 2024-01-10 ENCOUNTER — READMISSION MANAGEMENT (OUTPATIENT)
Dept: CALL CENTER | Facility: HOSPITAL | Age: 59
End: 2024-01-10
Payer: OTHER GOVERNMENT

## 2024-01-10 NOTE — OUTREACH NOTE
Medical Week 2 Survey      Flowsheet Row Responses   [facility] facility patient discharged from? [facility]   Does the patient have one of the following disease processes/diagnoses(primary or secondary)? Other   Week 2 attempt successful? No   Unsuccessful attempts Attempt 1            Kiley HORNE - Licensed Nurse

## 2024-01-16 ENCOUNTER — READMISSION MANAGEMENT (OUTPATIENT)
Dept: CALL CENTER | Facility: HOSPITAL | Age: 59
End: 2024-01-16
Payer: OTHER GOVERNMENT

## 2024-01-16 NOTE — OUTREACH NOTE
Medical Week 3 Survey      Flowsheet Row Responses   [facility] facility patient discharged from? [facility]   Does the patient have one of the following disease processes/diagnoses(primary or secondary)? Other   Week 3 attempt successful? No   Unsuccessful attempts Attempt 1            Kiley HORNE - Licensed Nurse

## 2024-01-19 ENCOUNTER — READMISSION MANAGEMENT (OUTPATIENT)
Dept: CALL CENTER | Facility: HOSPITAL | Age: 59
End: 2024-01-19
Payer: OTHER GOVERNMENT

## 2024-01-19 NOTE — OUTREACH NOTE
Medical Week 3 Survey      Flowsheet Row Responses   [facility] facility patient discharged from? [facility]   Does the patient have one of the following disease processes/diagnoses(primary or secondary)? Other   Week 3 attempt successful? No   Unsuccessful attempts Attempt 2            Ban FIGUEROA - Registered Nurse

## 2024-01-24 ENCOUNTER — LAB (OUTPATIENT)
Dept: FAMILY MEDICINE CLINIC | Facility: CLINIC | Age: 59
End: 2024-01-24
Payer: OTHER GOVERNMENT

## 2024-01-24 ENCOUNTER — OFFICE VISIT (OUTPATIENT)
Dept: FAMILY MEDICINE CLINIC | Facility: CLINIC | Age: 59
End: 2024-01-24
Payer: OTHER GOVERNMENT

## 2024-01-24 VITALS
DIASTOLIC BLOOD PRESSURE: 90 MMHG | WEIGHT: 200 LBS | HEART RATE: 67 BPM | BODY MASS INDEX: 29.62 KG/M2 | RESPIRATION RATE: 20 BRPM | HEIGHT: 69 IN | SYSTOLIC BLOOD PRESSURE: 152 MMHG | OXYGEN SATURATION: 100 %

## 2024-01-24 DIAGNOSIS — I10 ESSENTIAL HYPERTENSION: Primary | ICD-10-CM

## 2024-01-24 DIAGNOSIS — L60.0 INGROWN RIGHT BIG TOENAIL: ICD-10-CM

## 2024-01-24 DIAGNOSIS — R79.89 ELEVATED TSH: ICD-10-CM

## 2024-01-24 LAB — TSH SERPL DL<=0.05 MIU/L-ACNC: 2.92 UIU/ML (ref 0.27–4.2)

## 2024-01-24 PROCEDURE — 84443 ASSAY THYROID STIM HORMONE: CPT | Performed by: NURSE PRACTITIONER

## 2024-01-24 PROCEDURE — 36415 COLL VENOUS BLD VENIPUNCTURE: CPT

## 2024-01-24 PROCEDURE — 99213 OFFICE O/P EST LOW 20 MIN: CPT | Performed by: NURSE PRACTITIONER

## 2024-01-24 RX ORDER — AMLODIPINE BESYLATE 5 MG/1
5 TABLET ORAL
Qty: 90 TABLET | Refills: 1 | Status: SHIPPED | OUTPATIENT
Start: 2024-01-24

## 2024-01-24 NOTE — PROGRESS NOTES
"Chief Complaint  Follow-up    Subjective          Manoj Cleary presents to [facility]  History of Present Illness    Is here toady for follow up HTN    h/o HTN, obesity, and as dx at recent hospital stay: elevated LFTs, hyponatremia, hypokalemia   Current medicines are asa, amlodipine 5 mg qd, zyrtec, folic acid, metoprolol 25 mg bid    Is checking his BP at home  First morning reading is 140-150s/90s-100  When he rechecks it an hour later it is 120s-130s/80s  The first BP is prior to taking his morning pills    His BP today is mildly elevated, he tells me that he took his BP meds about 1 hour ago, he also does endorse a h/o white coat HTN      Review of Systems   Constitutional: Negative.  Negative for appetite change, fatigue and fever.   Respiratory: Negative.  Negative for shortness of breath.    Cardiovascular: Negative.  Negative for chest pain.   Neurological: Negative.  Negative for dizziness, weakness and headaches.   Psychiatric/Behavioral:  Negative for sleep disturbance.      Objective   Vital Signs:  /90 (BP Location: Left arm, Patient Position: Sitting)   Pulse 67   Resp 20   Ht 175.3 cm (69.02\")   Wt 90.7 kg (200 lb)   SpO2 100%   BMI 29.52 kg/m²     BP Readings from Last 3 Encounters:   01/24/24 152/90   01/03/24 168/92   12/30/23 150/93        Wt Readings from Last 3 Encounters:   01/24/24 90.7 kg (200 lb)   01/03/24 93.9 kg (207 lb)   12/30/23 92.4 kg (203 lb 9.6 oz)          Physical Exam  Constitutional:       Appearance: Normal appearance.   Neck:      Vascular: No carotid bruit.   Cardiovascular:      Rate and Rhythm: Normal rate.      Pulses: Normal pulses.      Heart sounds: Normal heart sounds.   Pulmonary:      Effort: Pulmonary effort is normal.      Breath sounds: Normal breath sounds.   Musculoskeletal:      Cervical back: Neck supple.      Right lower leg: No edema.      Left lower leg: No edema.   Skin:     General: Skin is warm. "   Neurological:      Mental Status: He is alert and oriented to person, place, and time.        Result Review :                 Assessment and Plan    Diagnoses and all orders for this visit:    1. Essential hypertension (Primary)  -     amLODIPine (NORVASC) 5 MG tablet; Take 1 tablet by mouth Daily.  Dispense: 90 tablet; Refill: 1    2. Elevated TSH  -     TSH Rfx On Abnormal To Free T4; Future    3. Ingrown right big toenail  -     Ambulatory Referral to Podiatry    Monitor BP, if is persistently elevated, return for follow-up/mamagement       Follow Up   Return in about 6 months (around 7/24/2024) for Recheck HTN, Annual physical.  Patient was given instructions and counseling regarding his condition or for health maintenance advice. Please see specific information pulled into the AVS if appropriate.

## 2024-02-08 ENCOUNTER — LAB (OUTPATIENT)
Dept: LAB | Facility: HOSPITAL | Age: 59
End: 2024-02-08
Payer: OTHER GOVERNMENT

## 2024-02-08 ENCOUNTER — TRANSCRIBE ORDERS (OUTPATIENT)
Dept: ADMINISTRATIVE | Facility: HOSPITAL | Age: 59
End: 2024-02-08
Payer: OTHER GOVERNMENT

## 2024-02-08 DIAGNOSIS — I10 ESSENTIAL HYPERTENSION, MALIGNANT: ICD-10-CM

## 2024-02-08 DIAGNOSIS — E87.1 HYPOSMOLALITY SYNDROME: Primary | ICD-10-CM

## 2024-02-08 DIAGNOSIS — E87.6 HYPOPOTASSEMIA: ICD-10-CM

## 2024-02-08 DIAGNOSIS — R94.5 NONSPECIFIC ABNORMAL RESULTS OF LIVER FUNCTION STUDY: ICD-10-CM

## 2024-02-08 DIAGNOSIS — E87.1 HYPOSMOLALITY SYNDROME: ICD-10-CM

## 2024-02-08 LAB
ANION GAP SERPL CALCULATED.3IONS-SCNC: 9 MMOL/L (ref 5–15)
BUN SERPL-MCNC: 8 MG/DL (ref 6–20)
BUN/CREAT SERPL: 9.8 (ref 7–25)
CALCIUM SPEC-SCNC: 9.7 MG/DL (ref 8.6–10.5)
CHLORIDE SERPL-SCNC: 97 MMOL/L (ref 98–107)
CO2 SERPL-SCNC: 27 MMOL/L (ref 22–29)
CREAT SERPL-MCNC: 0.82 MG/DL (ref 0.76–1.27)
EGFRCR SERPLBLD CKD-EPI 2021: 101.2 ML/MIN/1.73
GLUCOSE SERPL-MCNC: 117 MG/DL (ref 65–99)
POTASSIUM SERPL-SCNC: 4.5 MMOL/L (ref 3.5–5.2)
SODIUM SERPL-SCNC: 133 MMOL/L (ref 136–145)

## 2024-02-08 PROCEDURE — 36415 COLL VENOUS BLD VENIPUNCTURE: CPT

## 2024-02-08 PROCEDURE — 80048 BASIC METABOLIC PNL TOTAL CA: CPT

## 2024-02-20 ENCOUNTER — OFFICE VISIT (OUTPATIENT)
Dept: PODIATRY | Facility: CLINIC | Age: 59
End: 2024-02-20
Payer: OTHER GOVERNMENT

## 2024-02-20 VITALS
HEIGHT: 69 IN | OXYGEN SATURATION: 100 % | BODY MASS INDEX: 29.62 KG/M2 | RESPIRATION RATE: 14 BRPM | WEIGHT: 200 LBS | HEART RATE: 89 BPM

## 2024-02-20 DIAGNOSIS — L60.3 ONYCHODYSTROPHY: ICD-10-CM

## 2024-02-20 DIAGNOSIS — M79.674 GREAT TOE PAIN, RIGHT: Primary | ICD-10-CM

## 2024-02-20 DIAGNOSIS — L60.0 INGROWN RIGHT GREATER TOENAIL: ICD-10-CM

## 2024-02-20 RX ORDER — OMEPRAZOLE 20 MG/1
20 TABLET, DELAYED RELEASE ORAL
COMMUNITY

## 2024-02-20 NOTE — PROGRESS NOTES
02/20/2024  Foot and Ankle Surgery - New Patient   Provider: Dr. Remberto Lopez DPM  Location: [facility]    Subjective:  Manoj Cleary is a 58 y.o. male.     Chief Complaint   Patient presents with    Right Foot - Ingrown Toenail, Initial Evaluation    Initial Evaluation     RUSLAN rice       HPI: The patient is a 58-year-old male who presents to the clinic as a new patient today for issues involving his right great toe.    He states he was referred by Nurse practitioner Pavan. He reports he has been experiencing discomfort in his right great toe for approximately 3 to 4 months. The patient reports a history of an injury to the nail resulting in previous nail loss.     He notes he is leaving for [employer] on Thursday morning, 02/22/2024, and will be ambulating frequently.    No Known Allergies    Past Medical History:   Diagnosis Date    Hypertension     Hyponatremia     Ingrown toenail 01/07/2023    Causung some pain.  In addition, thick toe nail, hard to trim.       Past Surgical History:   Procedure Laterality Date    HERNIA REPAIR         Family History   Problem Relation Age of Onset    Heart disease Father        Social History     Socioeconomic History    Marital status:    Tobacco Use    Smoking status: Former     Types: Cigars     Passive exposure: Current    Smokeless tobacco: Never   Vaping Use    Vaping Use: Never used   Substance and Sexual Activity    Alcohol use: Not Currently     Alcohol/week: 20.0 standard drinks of alcohol    Drug use: Never    Sexual activity: Yes     Partners: Female     Birth control/protection: Post-menopausal        Current Outpatient Medications on File Prior to Visit   Medication Sig Dispense Refill    amLODIPine (NORVASC) 5 MG tablet Take 1 tablet by mouth Daily. 90 tablet 1    aspirin 81 MG EC tablet Take 1 tablet by mouth Daily.      cetirizine (zyrTEC) 10 MG tablet Take 1 tablet by mouth Daily.      folic acid (FOLVITE) 1 MG tablet Take 1  "tablet by mouth Daily. 30 tablet 0    metoprolol tartrate (LOPRESSOR) 25 MG tablet Take 1 tablet by mouth Every 12 (Twelve) Hours. 180 tablet 0    omeprazole OTC (PriLOSEC OTC) 20 MG EC tablet Take 1 tablet by mouth.       No current facility-administered medications on file prior to visit.       Review of Systems:  General: Denies fever, chills, fatigue, and weakness.  Eyes: Denies vision loss, blurry vision, and excessive redness.  ENT: Denies hearing issues and difficulty swallowing.  Cardiovascular: Denies palpitations, chest pain, or syncopal episodes.  Respiratory: Denies shortness of breath, wheezing, and coughing.  GI: Denies abdominal pain, nausea, and vomiting.   : Denies frequency, hematuria, and urgency.  Musculoskeletal: Denies muscle cramps, joint pains, and stiffness. Right great toe pain.   Derm: Denies rash, open wounds, or suspicious lesions.  Neuro: Denies headaches, numbness, loss of coordination, and tremors.  Psych: Denies anxiety and depression.  Endocrine: Denies temperature intolerance and changes in appetite.  Heme: Denies bleeding disorders or abnormal bruising.     Objective   Pulse 89   Resp 14   Ht 175.3 cm (69.02\")   Wt 90.7 kg (200 lb)   SpO2 100%   BMI 29.52 kg/m²     Foot/Ankle Exam    GENERAL  Orientation:  AAOx3  Affect:  appropriate    VASCULAR     Right Foot Vascularity   Normal vascular exam    Dorsalis pedis:  2+  Posterior tibial:  2+  Skin temperature:  warm  Edema grading:  None  CFT:  < 3 seconds  Pedal hair growth:  Present  Varicosities:  none     NEUROLOGIC     Right Foot Neurologic   Light touch sensation: normal  Hot/Cold sensation: normal  Achilles reflex:  2+    MUSCULOSKELETAL     Right Foot Musculoskeletal   Tenderness:  toe 1 tenderness    Arch:  Normal    MUSCLE STRENGTH     Right Foot Muscle Strength   Normal strength    Foot dorsiflexion:  5  Foot plantar flexion:  5  Foot inversion:  5  Foot eversion:  5    DERMATOLOGIC      Right Foot Dermatologic "   Skin  Right foot skin is intact.   Nails  1.  Positive for abnormal thickness, dystrophic nail and ingrown toenail.  Nails comment:  Nails 1-5     Left Foot Dermatologic   Nails comment:  Nails 1-5    TESTS     Right Foot Tests   Anterior drawer: negative  Varus tilt: negative     Right foot additional comments: Mild erythema and discomfort to the hyponychium region. No drainage or obvious signs of infection. No open wounds. Muscle strength and range of motion is appropriate. No other complicating features.      Assessment & Plan   Diagnoses and all orders for this visit:    1. Great toe pain, right (Primary)    2. Ingrown right greater toenail    3. Onychodystrophy      The patient presents to the office today for evaluation of issues involving his right great toe. No results were obtained or interpreted today. The patient is experiencing right great toe pain secondary to an ingrown toenail with thickness and dystrophy. Diagnosis and treatment options were discussed with the patient in detail today. Advised erythema and drainage are not always signs of infection as they may be signs of inflammation. Recommended performing Epsom salt soaks to soften the nail and to keep the nail filed down to decrease the thickness that leads to discomfort. Discussed a nail avulsion procedure with chemical matrixectomy along with the risks, benefits, and recovery. The patient would like to proceed with the nail avulsion after he returns from his trip. Greater than 30 minutes was spent before, during, and after evaluation for patient care.    No orders of the defined types were placed in this encounter.       Note is dictated utilizing voice recognition software. Unfortunately this leads to occasional typographical errors. I apologize in advance if the situation occurs. If questions occur please do not hesitate to call our office.    Transcribed from ambient dictation for GOOD Lopez DPM by Amalia Figueroa.  02/20/24   15:32  EST    Patient or patient representative verbalized consent to the visit recording.  I have personally performed the services described in this document as transcribed by the above individual, and it is both accurate and complete.

## 2024-02-21 ENCOUNTER — PATIENT ROUNDING (BHMG ONLY) (OUTPATIENT)
Dept: ORTHOPEDIC SURGERY | Facility: CLINIC | Age: 59
End: 2024-02-21
Payer: OTHER GOVERNMENT

## 2024-02-21 NOTE — PROGRESS NOTES
A my chart message has been sent to the patient for PATIENT ROUNDING with [facility]

## 2024-03-02 ENCOUNTER — TELEMEDICINE (OUTPATIENT)
Dept: FAMILY MEDICINE CLINIC | Facility: TELEHEALTH | Age: 59
End: 2024-03-02
Payer: OTHER GOVERNMENT

## 2024-03-02 DIAGNOSIS — H69.91 DYSFUNCTION OF RIGHT EUSTACHIAN TUBE: ICD-10-CM

## 2024-03-02 DIAGNOSIS — J06.9 VIRAL UPPER RESPIRATORY TRACT INFECTION: Primary | ICD-10-CM

## 2024-03-02 RX ORDER — PREDNISONE 10 MG/1
TABLET ORAL
Qty: 30 TABLET | Refills: 0 | Status: SHIPPED | OUTPATIENT
Start: 2024-03-02

## 2024-03-02 RX ORDER — FLUTICASONE PROPIONATE 50 MCG
2 SPRAY, SUSPENSION (ML) NASAL DAILY
Qty: 18.2 ML | Refills: 0 | Status: SHIPPED | OUTPATIENT
Start: 2024-03-02

## 2024-03-02 NOTE — PROGRESS NOTES
You have chosen to receive care through a telehealth visit.  Do you consent to use a video/audio connection for your medical care today? Yes     CHIEF COMPLAINT  Chief Complaint   Patient presents with    URI         HPI  Manoj Cleary is a 58 y.o. male  presents with complaint of post nasal drip, feeling of ear being clogged and nasal congestion.  Pt just returned From a trip by airplane.  He has tested for COVID and it is negative.    Review of Systems   HENT:  Positive for congestion and postnasal drip.    All other systems reviewed and are negative.      Past Medical History:   Diagnosis Date    Hypertension     Hyponatremia     Ingrown toenail 01/07/2023    Causung some pain.  In addition, thick toe nail, hard to trim.       Family History   Problem Relation Age of Onset    Heart disease Father        Social History     Socioeconomic History    Marital status:    Tobacco Use    Smoking status: Former     Types: Cigars     Passive exposure: Current    Smokeless tobacco: Never   Vaping Use    Vaping status: Never Used   Substance and Sexual Activity    Alcohol use: Not Currently     Alcohol/week: 20.0 standard drinks of alcohol    Drug use: Never    Sexual activity: Yes     Partners: Female     Birth control/protection: Post-menopausal       Manoj Cleary  reports that he has quit smoking. His smoking use included cigars. He has been exposed to tobacco smoke. He has never used smokeless tobacco.          There were no vitals taken for this visit.    PHYSICAL EXAM  Physical Exam   Constitutional: He appears well-developed and well-nourished.   HENT:   Head: Normocephalic.   Eyes: Pupils are equal, round, and reactive to light.   Pulmonary/Chest: Effort normal.   Musculoskeletal: Normal range of motion.   Neurological: He is alert.   Psychiatric: He has a normal mood and affect.       Results for orders placed or performed in visit on 02/08/24   Basic Metabolic Panel    Specimen: Blood   Result Value Ref  Range    Glucose 117 (H) 65 - 99 mg/dL    BUN 8 6 - 20 mg/dL    Creatinine 0.82 0.76 - 1.27 mg/dL    Sodium 133 (L) 136 - 145 mmol/L    Potassium 4.5 3.5 - 5.2 mmol/L    Chloride 97 (L) 98 - 107 mmol/L    CO2 27.0 22.0 - 29.0 mmol/L    Calcium 9.7 8.6 - 10.5 mg/dL    BUN/Creatinine Ratio 9.8 7.0 - 25.0    Anion Gap 9.0 5.0 - 15.0 mmol/L    eGFR 101.2 >60.0 mL/min/1.73       Diagnoses and all orders for this visit:    1. Viral upper respiratory tract infection (Primary)  -     predniSONE (DELTASONE) 10 MG tablet; 5 po for 2 days, 4 po for 2 days, 3 po for 2 days, 2 po for 2 days, 1 po for 2 days  Dispense: 30 tablet; Refill: 0  -     fluticasone (FLONASE) 50 MCG/ACT nasal spray; 2 sprays into the nostril(s) as directed by provider Daily.  Dispense: 18.2 mL; Refill: 0    2. Dysfunction of right eustachian tube  -     predniSONE (DELTASONE) 10 MG tablet; 5 po for 2 days, 4 po for 2 days, 3 po for 2 days, 2 po for 2 days, 1 po for 2 days  Dispense: 30 tablet; Refill: 0  -     fluticasone (FLONASE) 50 MCG/ACT nasal spray; 2 sprays into the nostril(s) as directed by provider Daily.  Dispense: 18.2 mL; Refill: 0          FOLLOW-UP  As discussed during visit with PCP/[facility] if no improvement or Urgent Care/Emergency Department if worsening of symptoms    Patient verbalizes understanding of medication dosage, comfort measures, instructions for treatment and follow-up.    LAUREANO Duffy  03/02/2024  02:36 EST    The use of a video visit has been reviewed with the patient and verbal informed consent has been obtained. Myself and Manoj Cleary participated in this visit. The patient is located in [address] [place] [address] [place] [facility].    I am located in [place], [place]. [employer] and [employer] were utilized. I spent 10 minutes in the patient's chart for this visit.      Note Disclaimer: At [facility], we believe that sharing information builds trust and better   relationships. You are receiving this note because  you recently visited [facility]. It is possible you   will see health information before a provider has talked with you about it. This kind of information can   be easy to misunderstand. To help you fully understand what it means for your health, we urge you to   discuss this note with your provider.

## 2024-03-04 ENCOUNTER — OFFICE VISIT (OUTPATIENT)
Dept: SLEEP MEDICINE | Facility: CLINIC | Age: 59
End: 2024-03-04
Payer: OTHER GOVERNMENT

## 2024-03-04 VITALS
OXYGEN SATURATION: 98 % | HEIGHT: 69 IN | WEIGHT: 201 LBS | BODY MASS INDEX: 29.77 KG/M2 | DIASTOLIC BLOOD PRESSURE: 95 MMHG | HEART RATE: 74 BPM | SYSTOLIC BLOOD PRESSURE: 167 MMHG

## 2024-03-04 DIAGNOSIS — G47.34 SLEEP RELATED HYPOXIA: ICD-10-CM

## 2024-03-04 DIAGNOSIS — G47.10 HYPERSOMNIA: ICD-10-CM

## 2024-03-04 DIAGNOSIS — E66.3 OVERWEIGHT WITH BODY MASS INDEX (BMI) 25.0-29.9: ICD-10-CM

## 2024-03-04 DIAGNOSIS — R06.83 SNORING: ICD-10-CM

## 2024-03-04 DIAGNOSIS — G47.30 SLEEP APNEA, UNSPECIFIED TYPE: Primary | ICD-10-CM

## 2024-03-04 DIAGNOSIS — G47.8 NON-RESTORATIVE SLEEP: ICD-10-CM

## 2024-03-04 PROCEDURE — 99214 OFFICE O/P EST MOD 30 MIN: CPT | Performed by: FAMILY MEDICINE

## 2024-03-04 PROCEDURE — G0463 HOSPITAL OUTPT CLINIC VISIT: HCPCS

## 2024-03-04 NOTE — PROGRESS NOTES
[facility] New [facility]ultation       Reason for Consultation: Hypertension, obesity      Patient Care Team:  Zay Browne APRN as PCP - General (Family Medicine)  Natanael Morfin MD as Consulting Physician (Sleep Medicine)      History of present illness:  Thank you for asking me to see your patient.  The patient is a 58 y.o. male presents due to concern for sleep disorder.  Patient was hospitalized in December 2023 for influenza hyponatremia and hypertension.  He was told during hospital stay there was a concern for obstructive sleep apnea due to oxygen saturations dropping throughout the night.  Concern for sleep-related hypoxia.  No history of prior sleep study or tonsillectomy.  Sleep into 30 minutes sleeps 6 to 8 hours 1-2 naps a day.  No rotating shifts.  BMI 29.7.    Medical Conditions (PMH):   Hypertension    Social history:  Do you drive a commercial vehicle:  No   Shift work:  Yes   Tobacco use:  No   Alcohol use: 2-3 per week  Caffeinated drinks: 2 per day  Occupation: AT    Family History (parents and siblings) (pertaining to sleep medicine):  Hypertension  Heart disease    Allergies:  Patient has no known allergies.       Current Outpatient Medications:     amLODIPine (NORVASC) 5 MG tablet, Take 1 tablet by mouth Daily., Disp: 90 tablet, Rfl: 1    aspirin 81 MG EC tablet, Take 1 tablet by mouth Daily., Disp: , Rfl:     cetirizine (zyrTEC) 10 MG tablet, Take 1 tablet by mouth Daily., Disp: , Rfl:     fluticasone (FLONASE) 50 MCG/ACT nasal spray, 2 sprays into the nostril(s) as directed by provider Daily., Disp: 18.2 mL, Rfl: 0    folic acid (FOLVITE) 1 MG tablet, Take 1 tablet by mouth Daily., Disp: 30 tablet, Rfl: 0    metoprolol tartrate (LOPRESSOR) 25 MG tablet, Take 1 tablet by mouth Every 12 (Twelve) Hours., Disp: 180 tablet, Rfl: 0    omeprazole OTC (PriLOSEC OTC) 20 MG EC tablet, Take 1 tablet by mouth., Disp: , Rfl:     predniSONE (DELTASONE) 10 MG tablet, 5 po for 2  "days, 4 po for 2 days, 3 po for 2 days, 2 po for 2 days, 1 po for 2 days, Disp: 30 tablet, Rfl: 0    Vital Signs:    Vitals:    03/04/24 0900   BP: 167/95   BP Location: Left arm   Patient Position: Sitting   Pulse: 74   SpO2: 98%   Weight: 91.2 kg (201 lb)   Height: 175.3 cm (69\")      Body mass index is 29.68 kg/m².  Neck Circumference: 20 inches      REVIEW OF SYSTEMS:  Pertinent positive symptoms are:  Snoring  Witnessed apnea  [place] Sleepiness Scale of Total score: 5   Fatigue  Nasal congestion  Postnasal drip      Physical exam:  Vitals:    03/04/24 0900   BP: 167/95   BP Location: Left arm   Patient Position: Sitting   Pulse: 74   SpO2: 98%   Weight: 91.2 kg (201 lb)   Height: 175.3 cm (69\")    Body mass index is 29.68 kg/m². Neck Circumference: 20 inches  HEENT: Head is atraumatic, normocephalic  Eyes: pupils are round equal and reacting to light and accommodation, conjunctiva normal  Throat: tongue normal  NECK:Neck Circumference: 20 inches  RESPIRATORY SYSTEM: Regular respirations  CARDIOVASULAR SYSTEM: Regular rate  EXTREMITES: No cyanosis, clubbing  NEUROLOGICAL SYSTEM: Oriented x 3, no gross motor defects, gait normal      Impression:  1. Sleep apnea, unspecified type    2. Hypersomnia    3. Non-restorative sleep    4. Overweight with body mass index (BMI) 25.0-29.9    5. Snoring    6. Sleep related hypoxia        Plan:    Office note(s) from care team reviewed. Office note(s) reviewed: 1/3/2024 PCP    Labs/ Test Results Reviewed:  TSH          12/27/2023    22:11 1/24/2024    08:36   TSH   TSH 5.120  2.920       Most Recent A1C          12/26/2023    23:22   HGBA1C Most Recent   Hemoglobin A1C 5.80    TSH improved; A1c in prediabetic range          ASSESSMENT AND PLAN:   Witnessed apnea: patient's symptoms and physical examination are concerning for possible sleep apnea.   I discussed the signs, symptoms, and pathophysiology of sleep apnea with this patient.  I also discussed the potential " complications of untreated sleep apnea including but not limited to resistant hypertension, insulin resistance, pulmonary hypertension, atrial fibrillation, heart attack, stroke, nonrestorative sleep with hypersomnia which can increase risk for motor vehicle accidents, etc.   Different testing methods including home-based and lab based sleep studies were discussed with this patient.   Based on patient history and physical examination, will proceed with home sleep study.  The order for the sleep study is placed in [facility].  The test will be scheduled after prior authorization has been obtained through patient's insurance.  Treatment and management will be discussed in more detail with this patient after the test is completed.  All questions were answered to patient's satisfaction.   Snoring: snoring is the sound created by turbulent airflow vibrating upper airway soft tissue.  I have also discussed factors affecting snoring including sleep deprivation, sleeping on the back and alcohol ingestion. To minimize snoring, patient is advised to have adequate sleep, sleep on their side, and avoid alcohol and sedative medications around bedtime.   Excessive daytime sleepiness:  [place] Sleepiness Scale of Total score: 5.  There are many causes of excessive daytime sleepiness.  Rule out sleep apnea as a contributing factor, as above.  Do not drive, operate heavy machinery, or do activities that require high concentration if feeling tired/drowsy.  Overweight: Body mass index is 29.68 kg/m².. Patients who are overweight or obese are at increased risk of sleep apnea/ sleep disordered breathing. Weight reduction and healthy lifestyle are encouraged in overweight/ obese patients as part of a comprehensive approach to sleep apnea treatment.    Sleep-related hypoxia noted during hospitalization    I have also discussed with the patient the following  Sleep hygiene: try to maintain a regular bed time and wake time, avoid watching TV/  using electronic devices in bed (including cell phones), limit caffeinated and alcoholic beverages before bed, try to maintain a cool and quiet sleep environment, avoid daytime naps  Adequate amount of sleep: most people need around 7 to 8 hours of sleep each night      Patient will follow-up after study, 31 to 90 days after PAP therapy initiated if applicable, or contact the office sooner for questions or concerns. Patient's questions were answered.      Thank you for allowing me to participate in your patient's care.    Natanael Morfin MD  Sleep Medicine  03/04/24  10:12 EST

## 2024-03-07 ENCOUNTER — OFFICE VISIT (OUTPATIENT)
Dept: PODIATRY | Facility: CLINIC | Age: 59
End: 2024-03-07
Payer: OTHER GOVERNMENT

## 2024-03-07 VITALS — BODY MASS INDEX: 29.77 KG/M2 | RESPIRATION RATE: 20 BRPM | HEIGHT: 69 IN | WEIGHT: 201 LBS

## 2024-03-07 DIAGNOSIS — L60.0 INGROWN RIGHT GREATER TOENAIL: ICD-10-CM

## 2024-03-07 DIAGNOSIS — M79.674 GREAT TOE PAIN, RIGHT: Primary | ICD-10-CM

## 2024-03-08 NOTE — PROGRESS NOTES
"03/07/2024  Foot and Ankle Surgery - Established Patient/Follow-up  Provider: Dr. Remberto Lopez DPM  Location: [facility]    Subjective:  Manoj Cleary is a 58 y.o. male.     Chief Complaint   Patient presents with    Right Foot - Follow-up, Ingrown Toenail     Wants toenail removed    Follow-up     RUSLAN Browne aprn  1/24/2024       HPI: Patient returns for nail removal involving his right great toe.  He states that he would like to proceed with permanent removal.  He denies any new issues or concerns.    No Known Allergies    Current Outpatient Medications on File Prior to Visit   Medication Sig Dispense Refill    amLODIPine (NORVASC) 5 MG tablet Take 1 tablet by mouth Daily. 90 tablet 1    aspirin 81 MG EC tablet Take 1 tablet by mouth Daily.      cetirizine (zyrTEC) 10 MG tablet Take 1 tablet by mouth Daily.      fluticasone (FLONASE) 50 MCG/ACT nasal spray 2 sprays into the nostril(s) as directed by provider Daily. 18.2 mL 0    folic acid (FOLVITE) 1 MG tablet Take 1 tablet by mouth Daily. 30 tablet 0    metoprolol tartrate (LOPRESSOR) 25 MG tablet Take 1 tablet by mouth Every 12 (Twelve) Hours. 180 tablet 0    omeprazole OTC (PriLOSEC OTC) 20 MG EC tablet Take 1 tablet by mouth.      predniSONE (DELTASONE) 10 MG tablet 5 po for 2 days, 4 po for 2 days, 3 po for 2 days, 2 po for 2 days, 1 po for 2 days 30 tablet 0     No current facility-administered medications on file prior to visit.       Objective   Resp 20   Ht 175.3 cm (69\")   Wt 91.2 kg (201 lb)   BMI 29.68 kg/m²     GENERAL  Orientation:  AAOx3  Affect:  appropriate     VASCULAR      Right Foot Vascularity   Normal vascular exam    Dorsalis pedis:  2+  Posterior tibial:  2+  Skin temperature:  warm  Edema grading:  None  CFT:  < 3 seconds  Pedal hair growth:  Present  Varicosities:  none     NEUROLOGIC      Right Foot Neurologic   Light touch sensation: normal  Hot/Cold sensation: normal  Achilles reflex:  2+     MUSCULOSKELETAL      " Right Foot Musculoskeletal   Tenderness:  toe 1 tenderness    Arch:  Normal     MUSCLE STRENGTH      Right Foot Muscle Strength   Normal strength    Foot dorsiflexion:  5  Foot plantar flexion:  5  Foot inversion:  5  Foot eversion:  5     DERMATOLOGIC       Right Foot Dermatologic   Skin  Right foot skin is intact.   Nails  1.  Positive for abnormal thickness, dystrophic nail and ingrown toenail.  Nails comment:  Nails 1-5      Left Foot Dermatologic   Nails comment:  Nails 1-5     TESTS      Right Foot Tests   Anterior drawer: negative  Varus tilt: negative     Right foot additional comments: Mild erythema and discomfort to the hyponychium region. No drainage or obvious signs of infection. No open wounds. Muscle strength and range of motion is appropriate. No other complicating features.    Assessment & Plan   Diagnoses and all orders for this visit:    1. Great toe pain, right (Primary)    2. Ingrown right greater toenail  -     Toe Nail Avulsion      Total Nail Avulsion with Chemical Matrixectomy: Right hallux    Consent and time out was performed before proceeding with the procedure.  Right hallux was cleansed with alcohol and the digit was blocked in a ring block fashion utilizing 5 mL of 1% lidocaine plain.  A digital tourniquet was applied to the digit.  The nail was lifted from the nail bed and nail margin with a [place].  The offending nail margins were grasped with a hemostat and removed. The nail borders were explored with a curette to ensure adequate removal.  Matrixectomy was performed utilizing three 30 second applications of 89% phenol on a micro-tip applicator.  The area was then rinsed with alcohol to dilute the phenol. The nail fold and exposed nail bed were flushed with normal saline.  Antibiotic ointment followed by sterile compressive dressings were then applied.  The digital tourniquot was removed.  No excessive bleeding or complications were evident.  The patient tolerated procedure and local  anesthetic well.     Orders Placed This Encounter   Procedures    Toe Nail Avulsion          Note is dictated utilizing voice recognition software. Unfortunately this leads to occasional typographical errors. I apologize in advance if the situation occurs. If questions occur please do not hesitate to call our office.

## 2024-04-26 DIAGNOSIS — I10 ESSENTIAL HYPERTENSION: ICD-10-CM

## 2024-05-08 ENCOUNTER — LAB (OUTPATIENT)
Dept: LAB | Facility: HOSPITAL | Age: 59
End: 2024-05-08
Payer: OTHER GOVERNMENT

## 2024-05-08 ENCOUNTER — TRANSCRIBE ORDERS (OUTPATIENT)
Dept: LAB | Facility: HOSPITAL | Age: 59
End: 2024-05-08
Payer: OTHER GOVERNMENT

## 2024-05-08 DIAGNOSIS — E87.1 HYPOSMOLALITY SYNDROME: Primary | ICD-10-CM

## 2024-05-08 DIAGNOSIS — R94.5 NONSPECIFIC ABNORMAL RESULTS OF LIVER FUNCTION STUDY: ICD-10-CM

## 2024-05-08 DIAGNOSIS — E87.6 HYPOPOTASSEMIA: ICD-10-CM

## 2024-05-08 DIAGNOSIS — I10 ESSENTIAL HYPERTENSION, MALIGNANT: ICD-10-CM

## 2024-05-08 DIAGNOSIS — E87.1 HYPOSMOLALITY SYNDROME: ICD-10-CM

## 2024-05-08 LAB
ANION GAP SERPL CALCULATED.3IONS-SCNC: 15.3 MMOL/L (ref 5–15)
BUN SERPL-MCNC: 7 MG/DL (ref 6–20)
BUN/CREAT SERPL: 8.1 (ref 7–25)
CALCIUM SPEC-SCNC: 9.4 MG/DL (ref 8.6–10.5)
CHLORIDE SERPL-SCNC: 99 MMOL/L (ref 98–107)
CO2 SERPL-SCNC: 21.7 MMOL/L (ref 22–29)
CREAT SERPL-MCNC: 0.86 MG/DL (ref 0.76–1.27)
EGFRCR SERPLBLD CKD-EPI 2021: 99.7 ML/MIN/1.73
GLUCOSE SERPL-MCNC: 92 MG/DL (ref 65–99)
POTASSIUM SERPL-SCNC: 4.2 MMOL/L (ref 3.5–5.2)
SODIUM SERPL-SCNC: 136 MMOL/L (ref 136–145)

## 2024-05-08 PROCEDURE — 36415 COLL VENOUS BLD VENIPUNCTURE: CPT

## 2024-05-08 PROCEDURE — 80048 BASIC METABOLIC PNL TOTAL CA: CPT

## 2024-07-30 ENCOUNTER — E-VISIT (OUTPATIENT)
Dept: FAMILY MEDICINE CLINIC | Facility: TELEHEALTH | Age: 59
End: 2024-07-30
Payer: OTHER GOVERNMENT

## 2024-07-30 PROCEDURE — BRIGHTMDVISIT: Performed by: NURSE PRACTITIONER

## 2024-07-30 NOTE — E-VISIT TREATED
Chief Complaint: Cold, flu, COVID, sinus, hay fever, or seasonal allergies   Patient introduction   Patient is 59-year-old male with congestion, nasal discharge, itchy or watery eyes, itchy nose or sneezing, and headache that started 6 to 9 days ago.   COVID-19 testing history, vaccination status, and exposure:    Patient was tested for COVID-19 within the last week. Patient specifies date of test as 07/26/24. Test result was negative.    Patient was prompted to take a self-test during the interview, but elected not to test now.    Vaccinated with the updated 4867-0930 COVID-19 vaccine (Pfizer-BioNTech or Moderna vaccine after September 12, 2023; or Novavax vaccine after October 3, 2023). Received their most recent dose of the vaccine more than 14 days ago.    No known exposure to a person with a confirmed or suspected case of COVID-19.    No high-risk (household) exposure to COVID-19 within the last 14 days.   Risk factors for severe disease from COVID-19 infection    Age 50 or older.    Higher risk for severe disease from COVID-19 because of BMI >= 25.    Smokes tobacco occasionally.    Hypertension.   Warning. The following may warrant further investigation:    Hypertension.   General presentation   No improvement of symptoms. Symptoms came on gradually.   Fever:    No fever.   Sinus and nasal symptoms:    Nasal discharge.    Itchy nose or sneezing.    Green nasal drainage.    Nasal drainage is thick.    Postnasal drip.    1 to 3 episodes of antibiotic treatment for sinus infection in the last year.    Sinus pain or pressure on or around the eyes, nose, cheeks, and upper teeth or jaw.    Patient first noticed sinus pain 5 to 9 days ago.    Sinus pain is worse with Valsalva.    No history of unhealed nasal septal ulcer/nasal wound.    No history of deviated septum or nasal polyps.   Throat symptoms:    No sore throat.   Head and body aches:    Headache described as moderate (4 to 6 on a scale of 1 to 10).    No  "sweats.    No chills.    No myalgia.    No fatigue.   Cough:    No cough.   Wheezing and shortness of breath:    No COPD diagnosis.    No asthma diagnosis.    No wheezing.    No shortness of breath.   Chest pain:    No chest pain.   Ear symptoms:    Current symptoms include crackling or popping in the ear(s).   Dizziness:    No dizziness.   Allergies:    Patient has known seasonal allergies and known perennial allergies.    Patient thinks symptoms are allergy-related.   Flu exposure:    No recent known exposure to a person with a confirmed flu diagnosis.    Has not had a flu vaccine this season.   Patient is taking over-the-counter medications for current symptoms, including cetirizine, ibuprofen, and omeprazole.   Review of red flags/alarm symptoms:    No changes in alertness or awareness.    No symptoms suggesting intracranial hemorrhage.    No decreased urination.    No blue or gray coloring present in face, lips, or nail beds.    No swelling, pain, redness, or increased warmth in the calf or lower part of ONE leg only.    No proptosis.   Risk factors for antibiotic resistance:    Smokes tobacco occasionally.   Self-exam:    Height: 5' 9\"    Weight: 201 lbs    Neck lymph nodes feel normal.    Mild periorbital edema.   Recent antibiotic use:    Has not taken antibiotics for similar symptoms within the past month.   Current medications   Currently taking folic acid 1 MG tablet, metoprolol tartrate 25 MG tablet, omeprazole OTC 20 MG EC tablet, amLODIPine 5 MG tablet, aspirin 81 MG EC tablet, and cetirizine 10 MG tablet.   Medication allergies   None.   Medication contraindication review   Patient has history of hypertension. Therefore, the following medication(s) will not be prescribed:    Metoclopramide.    Pseudoephedrine.   No history of metoclopramide-associated dystonic reaction and tardive dyskinesia.   No known history of amoxicillin-clavulanate-associated cholestatic jaundice or hepatic impairment.   No " known history of azithromycin-associated cholestatic jaundice or hepatic impairment.   Past medical history   Immune conditions:   No immunocompromising conditions.   No history of cancer.   Social history   Smokes tobacco occasionally.   High-risk household contacts:    Household contact with asthma.    Household contact with diabetes.   Patient did not request an excuse note.   Assessment   Bacterial sinusitis.   This is the likely diagnosis based on patient's interview responses, including:    Symptom profile    Duration of symptoms longer than 5 days    Symptoms have not improved   Plan   Medications:    amoxicillin 875 mg-potassium clavulanate 125 mg tablet RX 875mg/125mg 1 tab PO bid 7d for infection. This medication is an antibiotic. Take it exactly as directed. You must finish the entire course of medication, even if you feel better after the first few days of treatment. Amount is 14 tab.    flunisolide 25 mcg (0.025 %) nasal spray RX 25mcg 2 sprays each nostril nasal bid 30d for nasal symptoms due to allergies or sinusitis. Flunisolide needs to be used every day to be effective. It may take up to a week for the full effects of the medication to be seen. Amount is 25 mL.   The patient's prescriptions will be sent to:   [id number]   [address] [place] [id number]   Phone: [phone]     Fax: [phone]   Education:    Condition and causes    Prevention    Treatment and self-care    When to call provider   ----------   Electronically signed by LAUREANO Hu on 2024-07-30 at 14:33PM   ----------   Patient Interview Transcript:   Which of these symptoms are bothering you? Select all that apply.    Stuffed-up nose or sinuses    Runny nose    Itchy or watery eyes    Itchy nose or sneezing    Headache   Not selected:    Cough    Shortness of breath    Loss of smell or taste    Sore throat    Hoarse voice or loss of voice    Fever    Sweats    Chills    Muscle or  body aches    Fatigue or tiredness    Nausea or vomiting    Diarrhea    I don't have any of these symptoms   When did your current symptoms start? Select one.    6 to 9 days ago   Not selected:    Less than 48 hours ago    3 to 5 days ago    10 to 14 days ago    2 to 3 weeks ago    3 to 4 weeks ago    More than a month ago   Did your symptoms come on suddenly or gradually? Select one.    Gradually   Not selected:    Suddenly    I'm not sure   Have your symptoms improved at all since they began? Select one.    No   Not selected:    Yes, but they haven't gone away completely    Yes, but then they came back worse than before   Since your current symptoms started, have you been tested for COVID-19? This includes home self-tests as well as nose swab or saliva tests done at a doctor's office, lab, or testing site. Select one.    Yes   Not selected:    No   When was your most recent COVID-19 test? Select one.    Within the last week (specify date as MM/DD/YY): 07/26/24   Not selected:    Within the last 24 hours    7 to 14 days ago    15 to 30 days ago    More than 1 month ago   What was the result of your most recent COVID-19 test? Select one.    Negative   Not selected:    Positive   Even though your last test was negative, you could still have COVID. You may have tested before the virus was detectable. In some cases, repeat testing over several days is needed. - If you have a COVID test kit, take the test now before continuing this interview. - If you choose not to take a test or don't have one, you should still continue this interview. Your provider can still help you get care. Do you have a COVID test kit? Select one.    Yes, but I prefer not to take a test now   Not selected:    Yes, and I'll take another test now    No, I don't have a test kit   Has anyone in your household tested positive for COVID-19 in the past 14 days? Select one.    No   Not selected:    Yes   In the last 14 days, have you had close contact  "with someone who has COVID-19? \"Close contact\" means any of these: - Caring for someone with COVID-19. - Being within 6 feet of someone with COVID-19 for a total of at least 15 minutes over a 24-hour period. For example, three 5-minute exposures for a total of 15 minutes. - Being in direct contact with respiratory droplets from someone with COVID-19 (being coughed on, kissing, sharing utensils). Select one.    No, not that I know of   Not selected:    Yes, a confirmed case    Yes, a suspected case   Have you gotten the [id number] updated COVID-19 vaccine? This means either the updated Pfizer-BioNTech or Moderna vaccine after September 12, 2023; or the updated Novavax vaccine after October 3, 2023. Select one.    Yes   Not selected:    No   When did you get the updated COVID-19 vaccine? Select one.    More than 14 days ago   Not selected:    Less than 48 hours (2 days) ago    48 to 72 hours (3 days) ago    3 to 5 days ago    5 to 7 days ago    7 to 14 days ago   Since your symptoms started, have you felt dizzy? Select one.    No   Not selected:    Yes, but I can still do my regular daily activities    Yes, and it makes it hard to stand, walk, or do daily activities   Do you have chest pain? You might also feel it as discomfort, aching, tightness, or squeezing in the chest. Select one.    No   Not selected:    Yes   You mentioned having a headache. On a scale of 1 to 10, how severe is your headache pain? Select one.    Moderate (4 to 6)   Not selected:    Mild (1 to 3)    Severe (7 to 9)    Unbearable (10)    The worst headache of my life (10+)   Do you feel sinus pain or pressure in any of these areas?    Around my eyes    Behind my nose    In my cheeks    In my upper teeth or jaw   Not selected:    In my forehead    No   When did you first notice your sinus pain or pressure? Select one.    5 to 9 days ago   Not selected:    Less than 5 days ago    10 to 14 days ago    2 to 4 weeks ago    1 month ago or longer   Does " "coughing, sneezing, or leaning forward make your sinuses feel worse? Select one.    Yes   Not selected:    No   What color is your nasal drainage? Select one.    Green or greenish   Not selected:    Clear    White    Yellow or yellowish    My nose is stuffed but not draining or running   Is your nasal drainage thick or thin? Select one.    Thick   Not selected:    Thin   Is there any drainage (mucus) going down the back of your throat? This kind of drainage is also called \"postnasal drip.\" It can cause frequent throat clearing. Select one.    Yes   Not selected:    No, not that I know of   Have you urinated at least 3 times in the last 24 hours? Select one.    Yes   Not selected:    No   Do your face, lips, or nail beds appear blue or gray? Select one.    No   Not selected:    Yes   Do you have any swelling, pain, redness, or increased warmth in the calf or lower part of ONE leg only? Select one.    No   Not selected:    Yes   Changes in alertness or awareness may mean you need emergency care. Since your symptoms started, have you had any of these? Select all that apply.    None of the above   Not selected:    Confusion    Slurred speech    Not knowing where you are or what day it is    Difficulty staying conscious    Fainting or passing out   Do your symptoms include a whistling sound, or wheezing, when you breathe? Select one.    No   Not selected:    Yes   Are your eyelids or the areas around your eyes puffy? Select one.    Yes, but I can easily open my eye(s)   Not selected:    Yes, and it's hard to open my eye(s)    Yes, and my eye(s) are completely swollen shut    No   Since your symptoms started, have you noticed that one or both of your eyes is bulging or poking out? Select one.    No   Not selected:    Yes   Do you have any of these symptoms in your ear(s)? Select all that apply.    Crackling or popping   Not selected:    Pain    Pressure    Fullness    Plugged or blocked sensation    None of the above   " Are your glands/lymph nodes swollen, or does it hurt when you touch them?    No, not that I can tell   Not selected:    Yes   In the past week, has anyone around you (such as at school, work, or home) had a confirmed diagnosis of the flu? A confirmed diagnosis means that a nose swab was done to verify a flu infection. Select all that apply.    No, not that I know of   Not selected:    I live with someone who has the flu    I've been within touching distance of someone who has the flu    I've walked by, or sat about 3 feet away from, someone who has the flu    I've been in the same building as someone who has the flu   Have you ever been diagnosed with asthma? Select one.    No   Not selected:    Yes   Have you ever been diagnosed with chronic obstructive pulmonary disease (COPD)? Select one.    No, not that I know of   Not selected:    Yes   In the past month, have you taken antibiotics for similar symptoms? Examples of antibiotics include amoxicillin, amoxicillin-clavulanate (Augmentin), penicillin, cefdinir (Omnicef), doxycycline, and clindamycin (Cleocin). Select one.    No   Not selected:    Yes (specify)   In the last year, how many times were you treated with antibiotics for a sinus infection? Select one.    1 to 3 times   Not selected:    None    4 or more times   Do any of these apply to you? Select all that apply.    None of the above   Not selected:    I've been hospitalized within the last 5 days    I have diabetes    I'm in close contact with a child in    Have you been diagnosed with a deviated septum or nasal polyps? The nose is divided into two nostrils by the septum. A crooked septum is called a deviated septum. Nasal polyps are growths inside the nose or sinuses. Select one.    No, not that I know of   Not selected:    Yes, but I had surgery to treat them    Yes, I have a deviated septum    Yes, I have nasal polyps    Yes, I have a deviated septum and nasal polyps   Do you have a sore inside  your nose that won't heal? Select one.    No, not that I know of   Not selected:    Yes   Do you have allergies (pollen, dust mites, mold, animal dander)? Select one.    Yes   Not selected:    No, not that I know of   What kind of allergies do you have? Select all that apply.    Seasonal allergies (hay fever)    Perennial, or year-round, allergies (hay fever)   Not selected:    Pet allergies    Dust allergies    None of the above    I'm not sure   Do you think your symptoms could be allergy-related? Select one.    Yes   Not selected:    No    I'm not sure   Have you had a flu shot this season? Select one.    No   Not selected:    Yes, less than 2 weeks ago    Yes, 2 to 4 weeks ago    Yes, 1 to 3 months ago    Yes, 3 to 6 months ago    Yes, more than 6 months ago   The flu and COVID-19 can be more serious for people in certain groups. The next few questions help us figure out if you or anyone you live with is at higher risk for complications from these infections. Do any of these statements apply to you? Select all that apply.    None of the above   Not selected:    I'm     I'm     I'm Black    I'm  or    Do you smoke tobacco? Select one.    Yes, some days   Not selected:    Yes, every day    No, I quit    No   Do you have a mostly inactive lifestyle? Answer yes if all of these are true: - You spend at least 6 hours a day sitting or lying down - You get less than 2 and a half hours per week of moderate exercise such as walking fast - You get less than 1 hour and 15 minutes per week of intense exercise such as jogging or running Select one.    No   Not selected:    Yes   Do you have any of these conditions? Select all that apply.    None of the above   Not selected:    Chronic lung disease, such as cystic fibrosis or interstitial fibrosis    Heart disease, such as congenital heart disease, congestive heart failure, or coronary artery disease    Disorder of the brain, spinal  cord, or nerves and muscles, such as dementia, cerebral palsy, epilepsy, muscular dystrophy, or developmental delay    Metabolic disorder or mitochondrial disease    Cerebrovascular disease, such as stroke or another condition affecting the blood vessels or blood supply to the brain    Down syndrome    Mood disorder, including depression or schizophrenia spectrum disorders    Substance use disorder, such as alcohol, opioid, or cocaine use disorder    Tuberculosis    Primary immunodeficiency   Do you live in a group care setting? Examples include: - Nursing home - Residential care - Psychiatric treatment facility - [facility] - Homeless shelter - Foster care setting Select one.    No   Not selected:    Yes   Are you a healthcare worker? Select one.    No   Not selected:    Yes   People with a very high body mass index (BMI) are at higher risk for developing complications from the flu and severe illness from COVID-19. To determine your BMI, we need to know your weight and height. Please enter your weight (in pounds).    Weight   Please enter your height.    Height   Do you have any of these conditions that can affect the immune system? Scroll to see all options. Select all that apply.    None of these   Not selected:    History of bone marrow transplant    Chronic kidney disease    Chronic liver disease (including cirrhosis)    HIV/AIDS    Inflammatory bowel disease (Crohn's disease or ulcerative colitis)    Lupus    Moderate to severe plaque psoriasis    Multiple sclerosis    Rheumatoid arthritis    Sickle cell anemia    Alpha or beta thalassemia    History of kidney, liver, heart, or other solid organ transplant    History of liver, heart, or other solid organ transplant    History of spleen removal    An autoimmune disorder not listed here (specify)    A condition requiring treatment with long-term use of oral steroids (such as prednisone, prednisolone, or dexamethasone)  (specify)   Have you ever been diagnosed with cancer? Select one.    No   Not selected:    Yes, I have cancer now    Yes, but I'm in remission   The flu and COVID-19 can be more serious for people in certain groups. Do any of these apply to the people who live with you? Select all that apply.    None of the above   Not selected:    Under age 5    Over age 65            Black     or     Pregnant    Has given birth, had a miscarriage, had a pregnancy loss, or had an  in the last 2 weeks   Does any member of your household have any of these medical conditions? Select all that apply.    Asthma    Diabetes   Not selected:    Disorders of the brain, spinal cord, or nerves and muscles, such as dementia, cerebral palsy, epilepsy, muscular dystrophy, or developmental delay    Chronic lung disease, such as COPD or cystic fibrosis    Heart disease, such as congenital heart disease, congestive heart failure, or coronary artery disease    Cerebrovascular disease, such as stroke or another condition affecting the blood vessels or blood supply to the brain    Blood disorders, such as sickle cell disease    Metabolic disorders such as inherited metabolic disorders or mitochondrial disease    Kidney disorders    Liver disorders    Weakened immune system due to illness or medications such as chemotherapy or steroids    Children under the age of 19 who are on long-term aspirin therapy    Extreme obesity (BMI > 40)    None of the above   Do you have any of these conditions? Scroll to see all options. Select all that apply.    High blood pressure   Not selected:    Blockage or narrowing of the blood vessels of the heart    Blood dyscrasia, such anemia, leukemia, lymphoma, or myeloma    Bone marrow depression    Catecholamine-releasing paraganglioma    Congenital long QT syndrome    Depression    Difficulty urinating or completely emptying your bladder    Uncorrected electrolyte  abnormalities    Fungal infection    Gastrointestinal (GI) bleeding    Gastrointestinal (GI) obstruction    Recent heart attack    Irregular heartbeat or heart rhythm    Mononucleosis (mono)    Myasthenia gravis    Parkinson's disease    Pheochromocytoma    Reye syndrome    Seizure disorder    Thyroid disease    Ulcerative colitis    None of the above   Have you ever had either of these conditions? Select all that apply.    No   Not selected:    Metoclopramide-associated dystonic reaction    Tardive dyskinesia   Just a few more questions about medications, and then you're finished. Have you used any non-prescription medications or nasal sprays for your current symptoms? Examples include saline sprays, decongestants, NyQuil, and Tylenol. Select one.    Yes   Not selected:    No   Which of these non-prescription medications have you tried? Scroll to see all options. Select all that apply.    Cetirizine (Zyrtec)    Ibuprofen (Advil, Motrin, Midol)    Omeprazole (Prilosec)   Not selected:    Acetaminophen (Tylenol)    Budesonide (Rhinocort)    Chlorpheniramine (Aller-chlor, Chlor-Trimeton)    Cromolyn (NasalCrom)    Dextromethorphan (Delsym, Robitussin, Vicks DayQuil Cough)    Diphenhydramine (Benadryl)    Fexofenadine (Allegra)    Fluticasone (Flonase)    Guaifenesin (Mucinex)    Guaifenesin/dextromethorphan (Delsym DM, Mucinex DM, Robitussin DM)    Ketotifen (Alaway, Zaditor)    Loratadine (Alavert, Claritin)    Naphazoline-pheniramine (Naphcon-A, Opcon-A, Visine-A)    Oxymetazoline (Afrin)    Triamcinolone (Nasacort)    None of the above   Have you taken any monoamine oxidase inhibitor (MAOI) medications in the last 14 days? Examples include rasagiline (Azilect), selegiline (Eldepryl, Zelapar), isocarboxazid (Marplan), phenelzine (Nardil), and tranylcypromine (Parnate). Select one.    No, not that I know of   Not selected:    Yes   Do you take Kynmobi or Apokyn (apomorphine)? Select one.    No   Not selected:     Yes   Are you still taking these medications listed in your medical record? If you're not taking any of these, click Next. Select all that apply.    folic acid 1 MG tablet    metoprolol tartrate 25 MG tablet    omeprazole OTC 20 MG EC tablet    amLODIPine 5 MG tablet    aspirin 81 MG EC tablet    cetirizine 10 MG tablet   Are you taking any other medications, vitamins, or supplements? Select one.    No   Not selected:    Yes   Have you ever had an allergic or bad reaction to any medication? Select one.    No   Not selected:    Yes   Are you allergic to milk or to the proteins found in milk (for example, whey or casein)? A milk allergy is different from lactose intolerance. Select one.    No, not that I know of   Not selected:    Yes   Have you ever had jaundice or liver problems as a result of taking amoxicillin-clavulanate (Augmentin)? Jaundice is a condition in which the skin and the whites of the eyes turn yellow. Select all that apply.    No, not that I know of   Not selected:    Yes, jaundice    Yes, liver problems   Have you ever had jaundice or liver problems as a result of taking azithromycin (Zithromax, Zmax)? Jaundice is a condition in which the skin and the whites of the eyes turn yellow. Select all that apply.    No, not that I know of   Not selected:    Yes, jaundice    Yes, liver problems   Do you need a doctor's note? A doctor's note confirms that you received care today and states when you can return to school or work. It does not contain information about your diagnosis or treatment plan. Your provider will make the final decision on whether to give you a doctor's note and for how long. Doctor's notes CANNOT be backdated. We can't provide medical leave paperwork through this type of visit. If more paperwork is needed to request time off, contact your primary care provider. Select one.    No   Not selected:    Today only (1 day)    Today and tomorrow (2 days)    3 days    5 days    7 days   Is there  anything you'd like to add about your symptoms? Please limit your comments to the symptoms covered in this interview. If you include comments about other concerns, your provider may recommend that you be seen in person.   The patient did not enter any additional information.   ----------   Medical history   Medical history data does not currently exist for this patient.

## 2024-07-30 NOTE — EXTERNAL PATIENT INSTRUCTIONS
Diagnosis   Bacterial sinusitis   My name is LAUREANO Hu, and I'm a healthcare provider at [facility]. I reviewed your interview, and I see that you have bacterial sinusitis, also known as a bacterial sinus infection.   About your diagnosis   The sinuses are hollow spaces connected to the nasal passages. Sinusitis occurs when the sinuses swell and block the drainage of fluid and mucus from the nose, causing pain, pressure, and congestion. Fatigue, difficulty sleeping, or decreased appetite may accompany your symptoms.   More than 90% of sinus infections are caused by viruses. However, in certain cases, a sinus infection may be caused by bacteria. Bacterial sinus infections usually look like one of the following cases:    Severe sinus symptoms with a fever over 102F.    Sinus symptoms that have not improved at all after 10 days.    Cold symptoms that slowly improve but then worsen again after 5 or 6 days, usually with a high fever, headache, or nasal discharge.   What to expect   If you follow this treatment plan, you should start to feel better within a few days.   Medications   [id number] [place] [phone]     Prescription   Amoxicillin-clavulanate (875mg/125mg): Take 1 tablet by mouth twice a day for 7 days for infection. This medication is an antibiotic. Take it exactly as directed. You must finish the entire course of medication, even if you feel better after the first few days of treatment.   Flunisolide (25mcg): Spray 2 times in each nostril twice a day for nasal symptoms due to allergies or sinusitis. Flunisolide needs to be used every day to be effective. It may take up to a week for the full effects of the medication to be seen.    Start taking the antibiotics I've prescribed right away. You need to finish the entire course of antibiotics, even if you start to feel better before the pills run out.   Other  treatment    Rest! Your body needs rest to recover and fight infection.    Drink plenty of water to stay hydrated.    Use steam to soothe your sinuses: Breathe it in from a shower or a bowl of hot water. Placing a warm, moist washcloth over your nose and forehead may help relieve the sinus pain and pressure.    Try non-prescription saline nasal sprays to help your nasal symptoms. Try using a Neti Pot to flush out your stuffy nose and sinuses. Neti Pots are available at any drugstore without a prescription.    Avoid smoke and air pollution. Smoke can make infections worse. I encourage you to quit smoking, even for a week or two. Quitting will help your symptoms improve faster.   When to seek care   Call us at [phone]   with any sudden or unexpected symptoms.    Symptoms that last longer than 10 to 14 days.    Symptoms that get better for a few days, and then suddenly get worse.    Fever that measures over 103F or continues for more than 3 days.    Any vision changes.    Your headache worsens.    Stiff neck.    Swelling of your forehead or eyes.    Coughing up red or bloody mucus.    Swallowing becomes extremely difficult or impossible.    More than 5 episodes of diarrhea in a day.    More than 5 episodes of vomiting in a day.    Severe shortness of breath.    Severe chest pain   Preventing the spread of respiratory viruses   COVID-19, the flu, and other respiratory illnesses can have similar symptoms. These include fever, cough, shortness of breath, fatigue, muscle or body aches, headaches, new loss of sense of taste or smell, sore throat, stuffy or runny nose, nausea or vomiting, and diarrhea. Most people with a respiratory infection have mild symptoms and can rest at home until they get better. Elderly people and those with chronic medical problems may be at risk for more serious complications. It's important to take steps to prevent the spread of respiratory illness.   When you have symptoms of a  respiratory virus:   Stay home and away from others, including people you live with who aren't sick. This is called isolation. You can stop isolation and go back to normal activities when both of these are true:    You've been feeling better overall for 24 hours    You've had no fever for 24 hours, and you're not using fever-reducing medication like Tylenol or ibuprofen   Then, for the next 5 days:    Open windows at home when possible, and use an air purifier    Consider wearing a face mask when around other people indoors    Take a COVID-19 test before being around other people indoors    Keep a physical distance from people   If you start to feel worse or get a fever again, you need to start isolation over again.   If you've had close contact with someone who has COVID, but you don't have symptoms:   You don't need to quarantine. You should take a COVID-19 test at least 5 days after the last close contact.   If you test positive for COVID but don't have symptoms:   You might be contagious. For the next 5 days:    Open windows at home when possible, and use an air purifier    Consider wearing a face mask when around other people indoors    Take a COVID-19 test before being around other people indoors    Keep a physical distance from people   Other tips to prevent the spread of respiratory viruses    Cover your mouth and nose with a tissue when you cough or sneeze. Throw used tissues in a lined trash can right away, and wash your hands immediately after.    Wash your hands often with soap and water for at least 20 seconds. If soap and water aren't available, clean your hands with a hand  that contains at least 60% alcohol. Cover all surfaces of your hands and rub them together until they feel dry.    Avoid touching your face, especially your eyes, nose, and mouth.    Clean high-touch surfaces daily. High-touch surfaces include counters, tabletops, doorknobs, bathroom fixtures, toilets, phones, keyboards,  tablets, and bedside tables. You can use soap, detergents, 60%-80% ethanol or isopropyl alcohol,  such as Windex, or bleach. All of these  are effective at killing the virus that causes COVID-19.   COVID-19 vaccine information   COVID-19 vaccines are safe, effective, and free. Everyone 6 months or older can get an updated COVID-19 vaccine. Visit www.cdc.gov/coronavirus/2019-ncov/vaccines/stay-up-to-date.html   to find out how to stay up to date with your COVID-19 vaccines. Immunocompromised people can visit www.cdc.gov/coronavirus/2019-ncov/vaccines/recommendations/immuno.html  .   Side effects such as a sore arm, tiredness, headache, and muscle pain may occur within two days of getting the vaccine and last a day or two. For the Moderna or Pfizer vaccines, side effects are more common after the second dose. People over the age of 55 are less likely to have side effects than younger people.   People who've had COVID-19 should still get vaccinated to protect themselves. It's possible to be infected by the COVID-19 virus more than once.   People who've recently had COVID-19 should wait to get vaccinated until they've recovered and completed their isolation period.   To find a COVID-19 vaccination site near you, visit www.vaccines.gov/  , call 1-305.226.6352  , or text your zip code to [id number] ([employer]). Message and data rates may apply.   Flu vaccine information   Everyone 6 months of age and older should get a yearly flu vaccine. Children younger than 6 months old can't get the flu vaccine. This means infants are at high risk of serious complications from the flu. It's especially important for those who are around infants to get the flu vaccine.   It's best to get a flu shot by the end of October. Once you're vaccinated, it takes about two weeks for antibodies to develop and protect you against the flu. That's why it's important to get vaccinated as soon as possible.   Even if you don't get a flu  shot by the end of October, you should still get one. As long as the flu viruses are still in your community, flu vaccines will remain available, even into January of the following year or later.   You need to get a flu shot every year. Flu viruses are constantly changing, so flu vaccines are usually updated from one season to the next. Your protection from the flu vaccine also lessens over time.   Common side effects of the flu shot include soreness, redness and/or swelling where the shot was given, low grade fever, and aches. Common side effects of the nasal spray flu vaccine for adults include runny nose, headaches, sore throat, and cough. For children, side effects include wheezing, vomiting, muscle aches, and fever.   The flu vaccine is safe and effective. You can't get the flu from a flu vaccine.   It's safe to get the flu vaccine at the same time as a COVID-19 vaccine.   Contact your healthcare provider today for details on when and where to get your flu vaccine.   Your provider   Your diagnosis was provided by LAUREANO Hu, a member of your trusted care team at [facility].   If you have any questions, call us at [phone]  .

## 2024-08-02 DIAGNOSIS — I10 ESSENTIAL HYPERTENSION: ICD-10-CM

## 2024-09-04 ENCOUNTER — LAB (OUTPATIENT)
Dept: FAMILY MEDICINE CLINIC | Facility: CLINIC | Age: 59
End: 2024-09-04
Payer: OTHER GOVERNMENT

## 2024-09-04 ENCOUNTER — OFFICE VISIT (OUTPATIENT)
Dept: FAMILY MEDICINE CLINIC | Facility: CLINIC | Age: 59
End: 2024-09-04
Payer: OTHER GOVERNMENT

## 2024-09-04 VITALS
RESPIRATION RATE: 16 BRPM | HEART RATE: 104 BPM | SYSTOLIC BLOOD PRESSURE: 200 MMHG | DIASTOLIC BLOOD PRESSURE: 120 MMHG | BODY MASS INDEX: 30.07 KG/M2 | HEIGHT: 69 IN | WEIGHT: 203 LBS | OXYGEN SATURATION: 100 %

## 2024-09-04 DIAGNOSIS — M54.16 CHRONIC RADICULAR PAIN OF LOWER BACK: ICD-10-CM

## 2024-09-04 DIAGNOSIS — Z00.00 ANNUAL PHYSICAL EXAM: ICD-10-CM

## 2024-09-04 DIAGNOSIS — E55.9 VITAMIN D DEFICIENCY: ICD-10-CM

## 2024-09-04 DIAGNOSIS — Z00.00 ANNUAL PHYSICAL EXAM: Primary | ICD-10-CM

## 2024-09-04 DIAGNOSIS — I10 ESSENTIAL HYPERTENSION: ICD-10-CM

## 2024-09-04 DIAGNOSIS — G89.29 CHRONIC RADICULAR PAIN OF LOWER BACK: ICD-10-CM

## 2024-09-04 DIAGNOSIS — Z12.11 SCREENING FOR COLON CANCER: ICD-10-CM

## 2024-09-04 DIAGNOSIS — Z12.5 SCREENING PSA (PROSTATE SPECIFIC ANTIGEN): ICD-10-CM

## 2024-09-04 PROBLEM — J30.89 ENVIRONMENTAL AND SEASONAL ALLERGIES: Status: ACTIVE | Noted: 2024-09-04

## 2024-09-04 LAB — TSH SERPL DL<=0.05 MIU/L-ACNC: 3.75 UIU/ML (ref 0.27–4.2)

## 2024-09-04 PROCEDURE — 83036 HEMOGLOBIN GLYCOSYLATED A1C: CPT | Performed by: NURSE PRACTITIONER

## 2024-09-04 PROCEDURE — 80053 COMPREHEN METABOLIC PANEL: CPT | Performed by: NURSE PRACTITIONER

## 2024-09-04 PROCEDURE — 84443 ASSAY THYROID STIM HORMONE: CPT | Performed by: NURSE PRACTITIONER

## 2024-09-04 PROCEDURE — 82306 VITAMIN D 25 HYDROXY: CPT | Performed by: NURSE PRACTITIONER

## 2024-09-04 PROCEDURE — 80061 LIPID PANEL: CPT | Performed by: NURSE PRACTITIONER

## 2024-09-04 PROCEDURE — G0103 PSA SCREENING: HCPCS | Performed by: NURSE PRACTITIONER

## 2024-09-04 PROCEDURE — 99396 PREV VISIT EST AGE 40-64: CPT | Performed by: NURSE PRACTITIONER

## 2024-09-04 PROCEDURE — 36415 COLL VENOUS BLD VENIPUNCTURE: CPT

## 2024-09-04 PROCEDURE — 85027 COMPLETE CBC AUTOMATED: CPT | Performed by: NURSE PRACTITIONER

## 2024-09-04 NOTE — PROGRESS NOTES
"Chief Complaint  Annual Exam, URI, and Med Refill (Bp med been out for 3 weeks)    Subjective          Manoj HORNE Madiha presents to [facility]  History of Present Illness    He has h/o electrolyte disturbances (hypokalemia, hyponatremia), elevated LFTs, obesity, HTN, reflux, allergies    Current medicines are amlodipine 5 mg, asa 81 mg, zyrtec, flonase, folic acid, metoprolol 25 mg bid, omeprazole    Diet is reported to be fairly healthy    Exercises some, endorses that he could do more    Colon cancer screening has never been done, he denies any personal or FH of colon cancer and has not had any concerning symptoms    BP is elevated today, he does check it sometimes at home, not routinely  He has been out of the amlodipine for the past few weeks, he does not like the side effect of ankle edema  He also endorses that he has been taking zyrtec-D - none today    Review of Systems   Constitutional:  Positive for fatigue. Negative for activity change, appetite change and fever.        Endorses feeling fatigued since having sinus symptoms recently   Respiratory: Negative.  Negative for shortness of breath.    Cardiovascular:  Positive for leg swelling. Negative for chest pain and palpitations.        Endorses that he was having b/l ankle edema while taking the amlodipine   Gastrointestinal:  Positive for constipation. Negative for abdominal pain and blood in stool.        Endorses that he has been having some issues with constipation, has used dulcolax with some relief   Genitourinary: Negative.  Negative for dysuria, frequency and urgency.   Musculoskeletal:  Positive for arthralgias and back pain. Negative for myalgias.        Chronic back pain, uses motrin regularly for pain  Has done PT in the past with relief, has not been recently, does try to \"work it out\" himself   Allergic/Immunologic: Positive for environmental allergies. Negative for food allergies.   Neurological:  Negative " "for dizziness, weakness and headaches.        Endorses occasional headaches, more recently since he has been having sinus symptoms for the past few weeks to month   Psychiatric/Behavioral:  Positive for sleep disturbance. Negative for dysphoric mood. The patient is not nervous/anxious.         Typically sleeps well, has been struggling the past few weeks to get a good nights rest since his sinuses have been bothering him     Objective   Vital Signs:  BP (!) 200/120   Pulse 104   Resp 16   Ht 175.3 cm (69\")   Wt 92.1 kg (203 lb)   SpO2 100%   BMI 29.98 kg/m²     BP Readings from Last 3 Encounters:   09/04/24 (!) 200/120   03/04/24 167/95   01/24/24 152/90        Wt Readings from Last 3 Encounters:   09/04/24 92.1 kg (203 lb)   03/07/24 91.2 kg (201 lb)   03/04/24 91.2 kg (201 lb)      Physical Exam  Vitals reviewed.   Constitutional:       Appearance: Normal appearance.   HENT:      Right Ear: Tympanic membrane, ear canal and external ear normal.      Left Ear: Ear canal and external ear normal. There is impacted cerumen.      Nose: Nose normal.      Mouth/Throat:      Mouth: Mucous membranes are moist.      Pharynx: Oropharynx is clear.   Neck:      Thyroid: No thyromegaly.      Vascular: No carotid bruit.   Cardiovascular:      Rate and Rhythm: Normal rate and regular rhythm.      Pulses: Normal pulses.      Heart sounds: Normal heart sounds.   Pulmonary:      Effort: Pulmonary effort is normal.      Breath sounds: Normal breath sounds.   Abdominal:      General: Bowel sounds are normal.      Palpations: Abdomen is soft.      Tenderness: There is no abdominal tenderness. There is no right CVA tenderness or left CVA tenderness.   Musculoskeletal:         General: Normal range of motion.      Cervical back: Neck supple. No tenderness.      Right lower leg: No edema.      Left lower leg: No edema.   Lymphadenopathy:      Cervical: No cervical adenopathy.   Skin:     General: Skin is warm.   Neurological:      " Mental Status: He is alert and oriented to person, place, and time.   Psychiatric:         Mood and Affect: Mood normal.         Behavior: Behavior normal.        Result Review :     CMP          1/3/2024    08:37 2/8/2024    10:07 5/8/2024    12:22   CMP   Glucose 91  117  92    BUN 7  8  7    Creatinine 0.86  0.82  0.86    EGFR 99.7  101.2  99.7    Sodium 130  133  136    Potassium 4.4  4.5  4.2    Chloride 94  97  99    Calcium 9.5  9.7  9.4    Total Protein 7.2      Albumin 4.0      Globulin 3.2      Total Bilirubin 0.8      Alkaline Phosphatase 96      AST (SGOT) 41      ALT (SGPT) 71      Albumin/Globulin Ratio 1.3      BUN/Creatinine Ratio 8.1  9.8  8.1    Anion Gap 13.3  9.0  15.3      CBC          12/26/2023    16:24 12/26/2023    18:20 12/26/2023    23:22 12/29/2023    13:44 12/30/2023    12:10   CBC   WBC 6.80   4.80  7.00  6.80    RBC 5.00   4.54  3.90  3.80    Hemoglobin 17.1  15.3  15.1  13.3  13.0    Hematocrit 47.4  45  41.7  38.4  37.4    MCV 94.8   91.8  98.3  98.5    MCH 34.2   33.2  34.1  34.3    MCHC 36.1   36.2  34.7  34.8    RDW 12.6   12.4  12.4  12.6    Platelets 222   177  206  259      Lipid Panel          1/3/2024    08:37   Lipid Panel   Total Cholesterol 196    Triglycerides 87    HDL Cholesterol 40    VLDL Cholesterol 16    LDL Cholesterol  140    LDL/HDL Ratio 3.47      TSH          12/27/2023    22:11 1/24/2024    08:36   TSH   TSH 5.120  2.920      Most Recent A1C          12/26/2023    23:22   HGBA1C Most Recent   Hemoglobin A1C 5.80                Assessment and Plan    Diagnoses and all orders for this visit:    1. Annual physical exam (Primary)  -     Comprehensive Metabolic Panel; Future  -     CBC (No Diff); Future  -     Lipid Panel; Future  -     TSH Rfx On Abnormal To Free T4; Future  -     Vitamin D,25-Hydroxy; Future  -     Hemoglobin A1c; Future    2. Screening PSA (prostate specific antigen)  -     PSA Screen; Future    3. Chronic radicular pain of lower back  -      Ambulatory Referral to Physical Therapy for Evaluation & Treatment    4. Screening for colon cancer  -     Cologuard - Stool, Per Rectum; Future    5. Essential hypertension    Increase metoprolol to 50 mg bid (has plenty of medicine at home) monitor BP at home, close follow up   Advised to ER if develops any cardiac or neurological symptoms   Stop using the decongestant       Follow Up   Return in about 2 weeks (around 9/18/2024) for Recheck BP.  Patient was given instructions and counseling regarding his condition or for health maintenance advice. Please see specific information pulled into the AVS if appropriate.

## 2024-09-05 PROBLEM — E55.9 VITAMIN D DEFICIENCY: Status: ACTIVE | Noted: 2024-09-05

## 2024-09-05 LAB
25(OH)D3 SERPL-MCNC: 19.6 NG/ML (ref 30–100)
ALBUMIN SERPL-MCNC: 4.8 G/DL (ref 3.5–5.2)
ALBUMIN/GLOB SERPL: 1.4 G/DL
ALP SERPL-CCNC: 100 U/L (ref 39–117)
ALT SERPL W P-5'-P-CCNC: 82 U/L (ref 1–41)
ANION GAP SERPL CALCULATED.3IONS-SCNC: 13.8 MMOL/L (ref 5–15)
AST SERPL-CCNC: 68 U/L (ref 1–40)
BILIRUB SERPL-MCNC: 0.7 MG/DL (ref 0–1.2)
BUN SERPL-MCNC: 11 MG/DL (ref 8–23)
BUN/CREAT SERPL: 11.2 (ref 7–25)
CALCIUM SPEC-SCNC: 10.2 MG/DL (ref 8.6–10.5)
CHLORIDE SERPL-SCNC: 91 MMOL/L (ref 98–107)
CHOLEST SERPL-MCNC: 334 MG/DL (ref 0–200)
CO2 SERPL-SCNC: 25.2 MMOL/L (ref 22–29)
CREAT SERPL-MCNC: 0.98 MG/DL (ref 0.76–1.27)
DEPRECATED RDW RBC AUTO: 44.8 FL (ref 37–54)
EGFRCR SERPLBLD CKD-EPI 2021: 88.3 ML/MIN/1.73
ERYTHROCYTE [DISTWIDTH] IN BLOOD BY AUTOMATED COUNT: 12.7 % (ref 12.3–15.4)
GLOBULIN UR ELPH-MCNC: 3.5 GM/DL
GLUCOSE SERPL-MCNC: 98 MG/DL (ref 65–99)
HBA1C MFR BLD: 5.7 % (ref 4.8–5.6)
HCT VFR BLD AUTO: 47.6 % (ref 37.5–51)
HDLC SERPL-MCNC: 55 MG/DL (ref 40–60)
HGB BLD-MCNC: 16.3 G/DL (ref 13–17.7)
LDLC SERPL CALC-MCNC: 236 MG/DL (ref 0–100)
LDLC/HDLC SERPL: 4.28 {RATIO}
MCH RBC QN AUTO: 33.3 PG (ref 26.6–33)
MCHC RBC AUTO-ENTMCNC: 34.2 G/DL (ref 31.5–35.7)
MCV RBC AUTO: 97.3 FL (ref 79–97)
PLATELET # BLD AUTO: 284 10*3/MM3 (ref 140–450)
PMV BLD AUTO: 9.4 FL (ref 6–12)
POTASSIUM SERPL-SCNC: 4.2 MMOL/L (ref 3.5–5.2)
PROT SERPL-MCNC: 8.3 G/DL (ref 6–8.5)
PSA SERPL-MCNC: 0.47 NG/ML (ref 0–4)
RBC # BLD AUTO: 4.89 10*6/MM3 (ref 4.14–5.8)
SODIUM SERPL-SCNC: 130 MMOL/L (ref 136–145)
TRIGL SERPL-MCNC: 217 MG/DL (ref 0–150)
VLDLC SERPL-MCNC: 43 MG/DL (ref 5–40)
WBC NRBC COR # BLD AUTO: 4.61 10*3/MM3 (ref 3.4–10.8)

## 2024-09-05 RX ORDER — ERGOCALCIFEROL 1.25 MG/1
50000 CAPSULE, LIQUID FILLED ORAL WEEKLY
Qty: 12 CAPSULE | Refills: 0 | Status: SHIPPED | OUTPATIENT
Start: 2024-09-05

## 2024-09-17 ENCOUNTER — TREATMENT (OUTPATIENT)
Dept: PHYSICAL THERAPY | Facility: CLINIC | Age: 59
End: 2024-09-17
Payer: OTHER GOVERNMENT

## 2024-09-17 DIAGNOSIS — M54.16 CHRONIC RADICULAR PAIN OF LOWER BACK: Primary | ICD-10-CM

## 2024-09-17 DIAGNOSIS — G89.29 CHRONIC RADICULAR PAIN OF LOWER BACK: Primary | ICD-10-CM

## 2024-09-17 PROCEDURE — 97161 PT EVAL LOW COMPLEX 20 MIN: CPT | Performed by: PHYSICAL THERAPIST

## 2024-09-17 PROCEDURE — 97110 THERAPEUTIC EXERCISES: CPT | Performed by: PHYSICAL THERAPIST

## 2024-09-17 PROCEDURE — 97530 THERAPEUTIC ACTIVITIES: CPT | Performed by: PHYSICAL THERAPIST

## 2024-09-17 NOTE — PROGRESS NOTES
Physical Therapy Initial Evaluation and Plan of Care  [facility]  [address], [address]  [place] [address]    Patient: Manoj Cleary   : 1965  Referring practitioner: RHONDA Martinez  Date of Initial Visit: 2024  Today's Date: 2024  Patient seen for 1 sessions    Diagnosis/ICD-10 Codes:     ICD-10-CM ICD-9-CM   1. Chronic radicular pain of lower back  M54.16 724.4    G89.29 338.29              Subjective Questionnaire: Oswestry: 28% impairment      Subjective Evaluation    History of Present Illness  Date of onset: 2024  Mechanism of injury: Manoj Cleary is 59 male who reports to clinic today with current complaints of pain in his low back (right greater on right) and some radiation of pain in his legs (usually his right side as well).  He notes that when he has the leg pain, is is usually down the back of the leg to the knee.  He notes a long history of pain on and off, but recently it has started getting more intense and more frequent.      Patient Occupation: retired [employer]  Current pain rating: 3  At best pain ratin  At worst pain ratin  Quality: dull ache (intermittent sharp pain in legs)  Alleviating factors: notrin. elevation of leg, leaning forward.  Exacerbated by: extended walking.  Progression: worsening    Social Support  Lives in: multiple-level home  Lives with: spouse    Hand dominance: left    Treatments  Previous treatment: medication  Current treatment: physical therapy  Patient Goals  Patient goals for therapy: decreased pain, independence with ADLs/IADLs and increased strength  Patient goal: be able to walk for exercise         Objective        Special Questions      Additional Special Questions  Denies bowel dysfunction, bladder dysfunction and saddle (s4) numbness       Postural Observations  Seated posture: good  Standing posture: fair  Correction of posture: has no consistent effect      Neurological Testing     Sensation      Lumbar   Left   Intact: light touch    Right   Intact: light touch    Reflexes   Left   Patellar (L4): normal (2+)  Achilles (S1): normal (2+)    Right   Patellar (L4): normal (2+)  Achilles (S1): normal (2+)    Active Range of Motion     Lumbar   Flexion: WFL  Extension: WFL and with pain  Left lateral flexion: WFL  Right lateral flexion: WFL  Left rotation: WFL  Right rotation: WFL    Strength/Myotome Testing     Left Hip   Planes of Motion   Flexion: 5  Extension: 5  Abduction: 5  Adduction: 5    Right Hip   Planes of Motion   Flexion: 5  Extension: 5  Abduction: 5  Adduction: 5    Left Knee   Flexion: 5  Extension: 5    Right Knee   Flexion: 5  Extension: 5    Left Ankle/Foot   Dorsiflexion: 5  Plantar flexion: 5  Great toe extension: 5    Right Ankle/Foot   Dorsiflexion: 5  Plantar flexion: 5  Great toe extension: 5    Tests     Lumbar     Left   Negative passive SLR.     Right   Positive passive SLR.     Lumbar Flexibility Comments:   Decreased flexibility B hip flexors/hamstrings/quadriceps/piriformis muscles.    Issued, instructed in & performed home exercise program below:   Access Code: [email or web address]  URL: [email or web address]  Date: 09/17/2024  Prepared by: Dell Lin    Exercises  - Seated Flexion Stretch  - 5 x daily - 7 x weekly - 5 reps  - Supine Double Knee to Chest  - 3 x daily - 7 x weekly - 1 sets - 10 reps - 3 sec hold  - Supine with Legs Supported at 90/90  - 3 x daily - 7 x weekly - 10-15 min hold    Patient Education  - Lumbar Stenosis      Assessment & Plan       Assessment  Impairments: abnormal or restricted ROM, activity intolerance, lacks appropriate home exercise program and pain with function   Functional limitations: carrying objects, lifting, walking, pulling, pushing, uncomfortable because of pain and unable to perform repetitive tasks   Assessment details: 59 year old male who presents with the impairments noted above secondary to low back pain, radiating pain into B  lower extremities, pain with walking and extending back..  These impairments decrease his ability and tolerance to perform his normal daily activities.  This patient presents with a level of complexity and his condition is such that the services required can be safely and effectively performed only by a qualified PT or supervised PTA.     Prognosis: good    Goals  Plan Goals: STG (6 weeks)  1) Independent in home program for lower extremity and core strengthening  2) Independent in home program for posture correction  3) Demonstrates basic understanding of condition and role in treatment progression  4) Tolerates initiation of resistive lower extremity and core strengthening   5) Demonstrates moderate improvement in tolerance to daily activity as evidenced by Oswestry disability score of 20% or less    LTG (12 Weeks)  1) Independent in home program for self-management of  condition  2) Demonstrates substantial improvement in tolerance to daily activity as evidenced by Oswestry disability score of 10% or less  3) Demonstrates MMT for trunk flexion and extension of 5/5  to allow for improved spinal stability and improved tolerance to activities such as transfers and bending.  4)  Demonstrates normal lumbar spinal mechanics to allow for pain-free motion of the lumbar spine.  5) Demonstrates good posture in sitting to improve back ergonomics  6) Reports ability to return to return to walking for exercise without increasing pain and without mobility issues      Plan  Therapy options: will be seen for skilled therapy services  Planned modality interventions: cryotherapy, thermotherapy (hydrocollator packs), TENS, high voltage pulsed current (spasm management), high voltage pulsed current (pain management), traction, ultrasound and microcurrent electrical stimulation  Planned therapy interventions: abdominal trunk stabilization, body mechanics training, flexibility, functional ROM exercises, home exercise program, IADL  retraining, transfer training, therapeutic activities, stretching, strengthening, spinal/joint mobilization, soft tissue mobilization, postural training, neuromuscular re-education and manual therapy  Frequency: 2x week  Duration in weeks: 12  Treatment plan discussed with: patient        History # of Personal Factors and/or Comorbidities: LOW (0)  Examination of Body System(s): # of elements: LOW (1-2)  Clinical Presentation: STABLE   Clinical Decision Making: LOW     Timed:         Manual Therapy:         mins  08967;     Therapeutic Exercise:    15     mins  15499;     Neuromuscular Lisa:        mins  79687;    Therapeutic Activity:     10     mins  05153;     Gait Training:           mins  48111;     Ultrasound:          mins  59727;    Ionto                                   mins   82884  Self Care                            mins   14618      Un-Timed:  Electrical Stimulation:         mins  48806 ( );  Canalith Repos         mins 42975  Traction          mins 35474  Dry Needle                 ______ mins DRYNDL  Low Eval     30     Mins  44967  Mod Eval          Mins  59846  High Eval                            Mins  59937  Re-Eval                               mins  12341        Timed Treatment:   25   mins   Total Treatment:     55   mins    PT SIGNATURE: Nick Lin PT   DATE TREATMENT INITIATED: 9/17/2024    Initial Certification  Certification Period: 9/17/2024 through 12/16/2024  I certify that the therapy services are furnished while this patient is under my care.  The services outlined above are required by this patient, and will be reviewed every 90 days.     PHYSICIAN: Zay Browne, APRN      DATE:     Please sign and return via fax to [phone]. Thank you, [facility].

## 2024-09-18 ENCOUNTER — LAB (OUTPATIENT)
Dept: FAMILY MEDICINE CLINIC | Facility: CLINIC | Age: 59
End: 2024-09-18
Payer: OTHER GOVERNMENT

## 2024-09-18 ENCOUNTER — OFFICE VISIT (OUTPATIENT)
Dept: FAMILY MEDICINE CLINIC | Facility: CLINIC | Age: 59
End: 2024-09-18
Payer: OTHER GOVERNMENT

## 2024-09-18 VITALS
HEIGHT: 69 IN | DIASTOLIC BLOOD PRESSURE: 110 MMHG | OXYGEN SATURATION: 100 % | SYSTOLIC BLOOD PRESSURE: 182 MMHG | WEIGHT: 202.2 LBS | RESPIRATION RATE: 18 BRPM | BODY MASS INDEX: 29.95 KG/M2 | HEART RATE: 60 BPM

## 2024-09-18 DIAGNOSIS — E78.5 HYPERLIPIDEMIA, UNSPECIFIED HYPERLIPIDEMIA TYPE: ICD-10-CM

## 2024-09-18 DIAGNOSIS — I10 ESSENTIAL HYPERTENSION: Primary | ICD-10-CM

## 2024-09-18 DIAGNOSIS — I10 ESSENTIAL HYPERTENSION: ICD-10-CM

## 2024-09-18 PROCEDURE — 36415 COLL VENOUS BLD VENIPUNCTURE: CPT

## 2024-09-18 PROCEDURE — 99213 OFFICE O/P EST LOW 20 MIN: CPT | Performed by: NURSE PRACTITIONER

## 2024-09-18 PROCEDURE — 80061 LIPID PANEL: CPT | Performed by: NURSE PRACTITIONER

## 2024-09-18 PROCEDURE — 80053 COMPREHEN METABOLIC PANEL: CPT | Performed by: NURSE PRACTITIONER

## 2024-09-18 RX ORDER — LISINOPRIL 20 MG/1
20 TABLET ORAL DAILY
Qty: 30 TABLET | Refills: 1 | Status: SHIPPED | OUTPATIENT
Start: 2024-09-18

## 2024-09-18 RX ORDER — METOPROLOL TARTRATE 50 MG
50 TABLET ORAL 2 TIMES DAILY
Qty: 180 TABLET | Refills: 1 | Status: SHIPPED | OUTPATIENT
Start: 2024-09-18 | End: 2025-03-17

## 2024-09-19 ENCOUNTER — TREATMENT (OUTPATIENT)
Dept: PHYSICAL THERAPY | Facility: CLINIC | Age: 59
End: 2024-09-19
Payer: OTHER GOVERNMENT

## 2024-09-19 DIAGNOSIS — M54.16 CHRONIC RADICULAR PAIN OF LOWER BACK: Primary | ICD-10-CM

## 2024-09-19 DIAGNOSIS — G89.29 CHRONIC RADICULAR PAIN OF LOWER BACK: Primary | ICD-10-CM

## 2024-09-19 LAB
ALBUMIN SERPL-MCNC: 4.9 G/DL (ref 3.5–5.2)
ALBUMIN/GLOB SERPL: 1.7 G/DL
ALP SERPL-CCNC: 111 U/L (ref 39–117)
ALT SERPL W P-5'-P-CCNC: 76 U/L (ref 1–41)
ANION GAP SERPL CALCULATED.3IONS-SCNC: 17 MMOL/L (ref 5–15)
AST SERPL-CCNC: 62 U/L (ref 1–40)
BILIRUB SERPL-MCNC: 0.6 MG/DL (ref 0–1.2)
BUN SERPL-MCNC: 9 MG/DL (ref 8–23)
BUN/CREAT SERPL: 11.1 (ref 7–25)
CALCIUM SPEC-SCNC: 10.2 MG/DL (ref 8.6–10.5)
CHLORIDE SERPL-SCNC: 95 MMOL/L (ref 98–107)
CHOLEST SERPL-MCNC: 338 MG/DL (ref 0–200)
CO2 SERPL-SCNC: 24 MMOL/L (ref 22–29)
CREAT SERPL-MCNC: 0.81 MG/DL (ref 0.76–1.27)
EGFRCR SERPLBLD CKD-EPI 2021: 100.9 ML/MIN/1.73
GLOBULIN UR ELPH-MCNC: 2.9 GM/DL
GLUCOSE SERPL-MCNC: 108 MG/DL (ref 65–99)
HDLC SERPL-MCNC: 50 MG/DL (ref 40–60)
LDLC SERPL CALC-MCNC: 227 MG/DL (ref 0–100)
LDLC/HDLC SERPL: 4.59 {RATIO}
POTASSIUM SERPL-SCNC: 4.2 MMOL/L (ref 3.5–5.2)
PROT SERPL-MCNC: 7.8 G/DL (ref 6–8.5)
SODIUM SERPL-SCNC: 136 MMOL/L (ref 136–145)
TRIGL SERPL-MCNC: 293 MG/DL (ref 0–150)
VLDLC SERPL-MCNC: 61 MG/DL (ref 5–40)

## 2024-09-19 RX ORDER — ROSUVASTATIN CALCIUM 5 MG/1
5 TABLET, COATED ORAL DAILY
Qty: 90 TABLET | Refills: 1 | Status: SHIPPED | OUTPATIENT
Start: 2024-09-19

## 2024-09-19 NOTE — PROGRESS NOTES
Physical Therapy Treatment Note  [facility] [place]  [address], [address]  [place] [address]    Patient: Manoj Cleary   : 1965  Referring practitioner: RHONDA Martinez  Date of Initial Visit: Type: THERAPY  Noted: 2024  Today's Date: 2024  Patient seen for 2 sessions    Diagnosis/ICD-10 Codes:     ICD-10-CM ICD-9-CM   1. Chronic radicular pain of lower back  M54.16 724.4    G89.29 338.29                  Subjective     Manoj Cleary reports: He had no issues with home program, got a little relief with the positional distraction and felt the exercises helped.    Objective   See Exercise, Manual, and Modality Logs for complete treatment.     Progressed  therapeutic exercises and therapeutic activities.     Initiated positional distraction.    Issued, instructed in & performed home exercise program below:   Access Code: [email or web address]  URL: [email or web address]  Date: 2024  Prepared by: Dell Lin    Exercises  - Seated Flexion Stretch  - 5 x daily - 7 x weekly - 5 reps  - Supine Double Knee to Chest  - 3 x daily - 7 x weekly - 1 sets - 10 reps - 3 sec hold  - Supine Single Knee to Chest Stretch  - 1 x daily - 7 x weekly - 1 sets - 10 reps - 3 sec hold  - Supine Posterior Pelvic Tilt  - 1 x daily - 7 x weekly - 1 sets - 10 reps - 5 sec hold  - Hooklying Gluteal Sets  - 1 x daily - 7 x weekly - 1 sets - 10 reps - 5 sec hold  - Hooklying Transversus Abdominis Palpation  - 1 x daily - 7 x weekly - 1 sets - 10 reps - 5 sec hold  - Hooklying Clamshell with Resistance  - 1 x daily - 7 x weekly - 1 sets - 10 reps - 5 sec hold  - Supine Hip Adduction Isometric with Ball  - 1 x daily - 7 x weekly - 1 sets - 10 reps - 5 sec hold  - Supine with Legs Supported at 90/90  - 3 x daily - 7 x weekly - 10-15 min hold      Assessment/Plan  Some initial slight decrease in pain.  Doing well with exercises in clinic.  Noted decreased symptoms with positional distraction.    Progress  per Plan of Care and Progress strengthening /stabilization /functional activity.  Assess response to today's interventions.  Assess response to home exercise program.           Manual Therapy:         mins  87209;  Therapeutic Exercise:    20     mins  61041;     Neuromuscular Lisa:    10    mins  16109;    Therapeutic Activity:    10    mins  20521;     Gait Training:           mins  37552;     Ultrasound:          mins  59562;    Electrical Stimulation:         mins  99960 ( );  Dry Needling         mins self-pay    Timed Treatment:   40   mins   Total Treatment:     40   mins    Nick Lin PT  Physical Therapist

## 2024-09-26 ENCOUNTER — TREATMENT (OUTPATIENT)
Dept: PHYSICAL THERAPY | Facility: CLINIC | Age: 59
End: 2024-09-26
Payer: OTHER GOVERNMENT

## 2024-09-26 DIAGNOSIS — G89.29 CHRONIC RADICULAR PAIN OF LOWER BACK: Primary | ICD-10-CM

## 2024-09-26 DIAGNOSIS — M54.16 CHRONIC RADICULAR PAIN OF LOWER BACK: Primary | ICD-10-CM

## 2024-09-30 ENCOUNTER — TREATMENT (OUTPATIENT)
Dept: PHYSICAL THERAPY | Facility: CLINIC | Age: 59
End: 2024-09-30
Payer: OTHER GOVERNMENT

## 2024-09-30 DIAGNOSIS — M54.16 CHRONIC RADICULAR PAIN OF LOWER BACK: Primary | ICD-10-CM

## 2024-09-30 DIAGNOSIS — G89.29 CHRONIC RADICULAR PAIN OF LOWER BACK: Primary | ICD-10-CM

## 2024-09-30 NOTE — PROGRESS NOTES
Physical Therapy Treatment Note  [facility] [place]  [address], [address]  [place] [address]    Patient: Manoj Cleary   : 1965  Referring practitioner: RHONDA Martinez  Date of Initial Visit: Type: THERAPY  Noted: 2024  Today's Date: 2024  Patient seen for 4 sessions    Diagnosis/ICD-10 Codes:     ICD-10-CM ICD-9-CM   1. Chronic radicular pain of lower back  M54.16 724.4    G89.29 338.29              Subjective     Manoj Cleary reports: Feels like his back is improving as is his tolerance to activity.    Objective   See Exercise, Manual, and Modality Logs for complete treatment.     Progressed  therapeutic exercises and therapeutic activities.      Continued positional distraction.       Assessment/Plan    Good improvement visit over visit with decreasing pain and improved tolerance to activity.    Progress per Plan of Care and Progress strengthening /stabilization /functional activity           Manual Therapy:                 mins  06113;  Therapeutic Exercise:    20     mins  43609;     Neuromuscular Lisa:    10    mins  36982;    Therapeutic Activity:     10    mins  77791;     Gait Training:                      mins  21103;     Ultrasound:                          mins  98683;    Electrical Stimulation:         mins  56924 ( );  Dry Needling                      mins self-pay     Timed Treatment:   40   mins   Total Treatment:     40   min    Nick Lin PT  Physical Therapist

## 2024-10-02 ENCOUNTER — OFFICE VISIT (OUTPATIENT)
Dept: FAMILY MEDICINE CLINIC | Facility: CLINIC | Age: 59
End: 2024-10-02
Payer: OTHER GOVERNMENT

## 2024-10-02 VITALS
OXYGEN SATURATION: 98 % | WEIGHT: 201.6 LBS | RESPIRATION RATE: 16 BRPM | HEIGHT: 69 IN | SYSTOLIC BLOOD PRESSURE: 162 MMHG | HEART RATE: 61 BPM | BODY MASS INDEX: 29.86 KG/M2 | DIASTOLIC BLOOD PRESSURE: 100 MMHG

## 2024-10-02 DIAGNOSIS — R09.81 SINUS CONGESTION: ICD-10-CM

## 2024-10-02 DIAGNOSIS — I10 ESSENTIAL HYPERTENSION: Primary | ICD-10-CM

## 2024-10-02 PROCEDURE — 99213 OFFICE O/P EST LOW 20 MIN: CPT | Performed by: NURSE PRACTITIONER

## 2024-10-02 RX ORDER — LISINOPRIL 20 MG/1
20 TABLET ORAL 2 TIMES DAILY
Qty: 60 TABLET | Refills: 2 | Status: SHIPPED | OUTPATIENT
Start: 2024-10-02

## 2024-10-02 NOTE — PROGRESS NOTES
"Chief Complaint  Hypertension    Subjective          Manoj Cleary presents to [facility]  History of Present Illness    Is here today for follow up on HTN, elevated cholesterol as noted below    He was started on crestor 5 mg and encouraged to work on diet and exercise    He denies any medication side effects with the crestor, endorses that he is tolerating it well    BP is being managed with lisinopril 20 mg qd, metoprolol 50 mg bid    BP is being monitored at home, has seen 140s-180/100 at home  Has not made any big changes to activity, is working on diet changes  Denies any c/o CP, soa, edema, ha, dizziness, weakness    Will increase lisinopril to 20 mg bid, continue metoprolol at 50 bid  He has been on amlodipine but had c/o ankle edema as side effect  Can consider adding hctz but will need to monitor labs - has h/o low sodium, low potassium - has seen nephrology, Dr. Giraldo    Review of Systems   HENT:  Positive for sinus pain.    Respiratory:  Negative for shortness of breath.    Cardiovascular:  Negative for chest pain and palpitations.   Allergic/Immunologic: Positive for environmental allergies.   Neurological:  Positive for headaches. Negative for dizziness and weakness.   Psychiatric/Behavioral:          Sleep is good some nights and not so good other nights     Objective   Vital Signs:  /100 (BP Location: Left arm, Patient Position: Sitting)   Pulse 61   Resp 16   Ht 175.3 cm (69\")   Wt 91.4 kg (201 lb 9.6 oz)   SpO2 98%   BMI 29.77 kg/m²     BP Readings from Last 3 Encounters:   10/02/24 162/100   09/18/24 (!) 182/110   09/04/24 (!) 200/120        Wt Readings from Last 3 Encounters:   10/02/24 91.4 kg (201 lb 9.6 oz)   09/18/24 91.7 kg (202 lb 3.2 oz)   09/04/24 92.1 kg (203 lb)      Physical Exam  Constitutional:       Appearance: Normal appearance.   Neck:      Vascular: No carotid bruit.   Cardiovascular:      Rate and Rhythm: Normal rate and regular " rhythm.      Heart sounds: Normal heart sounds.   Pulmonary:      Effort: Pulmonary effort is normal.      Breath sounds: Normal breath sounds.   Musculoskeletal:      Cervical back: Neck supple.      Right lower leg: No edema.      Left lower leg: No edema.   Skin:     General: Skin is warm.   Neurological:      Mental Status: He is alert and oriented to person, place, and time.   Psychiatric:         Behavior: Behavior normal.        Result Review :     CMP          5/8/2024    12:22 9/4/2024    15:16 9/18/2024    14:31   CMP   Glucose 92  98  108    BUN 7  11  9    Creatinine 0.86  0.98  0.81    EGFR 99.7  88.3  100.9    Sodium 136  130  136    Potassium 4.2  4.2  4.2    Chloride 99  91  95    Calcium 9.4  10.2  10.2    Total Protein  8.3  7.8    Albumin  4.8  4.9    Globulin  3.5  2.9    Total Bilirubin  0.7  0.6    Alkaline Phosphatase  100  111    AST (SGOT)  68  62    ALT (SGPT)  82  76    Albumin/Globulin Ratio  1.4  1.7    BUN/Creatinine Ratio 8.1  11.2  11.1    Anion Gap 15.3  13.8  17.0      CBC          12/29/2023    13:44 12/30/2023    12:10 9/4/2024    15:16   CBC   WBC 7.00  6.80  4.61    RBC 3.90  3.80  4.89    Hemoglobin 13.3  13.0  16.3    Hematocrit 38.4  37.4  47.6    MCV 98.3  98.5  97.3    MCH 34.1  34.3  33.3    MCHC 34.7  34.8  34.2    RDW 12.4  12.6  12.7    Platelets 206  259  284      Lipid Panel          1/3/2024    08:37 9/4/2024    15:16 9/18/2024    14:31   Lipid Panel   Total Cholesterol 196  334  338    Triglycerides 87  217  293    HDL Cholesterol 40  55  50    VLDL Cholesterol 16  43  61    LDL Cholesterol  140  236  227    LDL/HDL Ratio 3.47  4.28  4.59      TSH          12/27/2023    22:11 1/24/2024    08:36 9/4/2024    15:16   TSH   TSH 5.120  2.920  3.750      Most Recent A1C          9/4/2024    15:16   HGBA1C Most Recent   Hemoglobin A1C 5.70                Assessment and Plan    Diagnoses and all orders for this visit:    1. Essential hypertension (Primary)  -     lisinopril  (PRINIVIL,ZESTRIL) 20 MG tablet; Take 1 tablet by mouth 2 (Two) Times a Day.  Dispense: 60 tablet; Refill: 2    2. Sinus congestion  -     Ambulatory Referral to ENT (Otolaryngology)    Increase lisinopril to 40 mg qd (20 mg bid), continue metoprolol 50 mg bid, encouraged diet and activity  Monitor BP, close follow up       Follow Up   Return in about 4 weeks (around 10/30/2024) for Recheck BP.  Patient was given instructions and counseling regarding his condition or for health maintenance advice. Please see specific information pulled into the AVS if appropriate.

## 2024-10-03 ENCOUNTER — TREATMENT (OUTPATIENT)
Dept: PHYSICAL THERAPY | Facility: CLINIC | Age: 59
End: 2024-10-03
Payer: OTHER GOVERNMENT

## 2024-10-03 DIAGNOSIS — G89.29 CHRONIC RADICULAR PAIN OF LOWER BACK: Primary | ICD-10-CM

## 2024-10-03 DIAGNOSIS — M54.16 CHRONIC RADICULAR PAIN OF LOWER BACK: Primary | ICD-10-CM

## 2024-10-03 NOTE — PROGRESS NOTES
Physical Therapy Treatment Note  [facility] [place]  [address], [address]  [place] [address]    Patient: Manoj Cleary   : 1965  Referring practitioner: RHONDA Martinez  Date of Initial Visit: Type: THERAPY  Noted: 2024  Today's Date: 10/3/2024  Patient seen for 5 sessions    Diagnosis/ICD-10 Codes:       ICD-10-CM ICD-9-CM   1. Chronic radicular pain of lower back  M54.16 724.4    G89.29 338.29              Subjective     Manoj Cleary reports: He is pleased with progress and his back is getting better each day.  Feels exercises are helping.    Objective   See Exercise, Manual, and Modality Logs for complete treatment.     Continued therapeutic exercises and therapeutic activities.      Continued positional distraction.        Assessment/Plan     Good improvement visit over visit with decreasing pain and improved tolerance to activity.     Progress per Plan of Care and Progress strengthening /stabilization /functional activity        Manual Therapy:                 mins  89529;  Therapeutic Exercise:    20     mins  88619;     Neuromuscular Lisa:    10    mins  54953;    Therapeutic Activity:     10    mins  89723;     Gait Training:                      mins  86338;     Ultrasound:                          mins  34061;    Electrical Stimulation:         mins  60826 ( );  Dry Needling                      mins self-pay     Timed Treatment:   40   mins   Total Treatment:     40   min    Nick Lin PT  Physical Therapist

## 2024-10-07 ENCOUNTER — TELEPHONE (OUTPATIENT)
Dept: PHYSICAL THERAPY | Facility: CLINIC | Age: 59
End: 2024-10-07

## 2024-10-09 ENCOUNTER — TREATMENT (OUTPATIENT)
Dept: PHYSICAL THERAPY | Facility: CLINIC | Age: 59
End: 2024-10-09
Payer: OTHER GOVERNMENT

## 2024-10-09 DIAGNOSIS — G89.29 CHRONIC RADICULAR PAIN OF LOWER BACK: Primary | ICD-10-CM

## 2024-10-09 DIAGNOSIS — M54.16 CHRONIC RADICULAR PAIN OF LOWER BACK: Primary | ICD-10-CM

## 2024-10-14 NOTE — PROGRESS NOTES
Physical Therapy Treatment Note  [facility] [place]  [address], [address]  [place] [address]    Patient: Manoj Cleary   : 1965  Referring practitioner: RHONDA Martinez  Date of Initial Visit: Type: THERAPY  Noted: 2024  Today's Date: 2024  Patient seen for 3 sessions    Diagnosis/ICD-10 Codes:     ICD-10-CM ICD-9-CM   1. Chronic radicular pain of lower back  M54.16 724.4    G89.29 338.29                  Subjective     Manoj Cleary reports: continued relief with the positional distraction and overall improvement with decreasing back pain.    Objective   See Exercise, Manual, and Modality Logs for complete treatment.     Progressed  therapeutic exercises and therapeutic activities.     Continued positional distraction.    Issued, instructed in & performed home exercise program below:   Access Code: [email or web address]  URL: [email or web address]  Date: 2024  Prepared by: Dell Lin    Exercises  - Seated Flexion Stretch  - 5 x daily - 7 x weekly - 5 reps  - Supine Double Knee to Chest  - 3 x daily - 7 x weekly - 1 sets - 10 reps - 3 sec hold  - Supine Single Knee to Chest Stretch  - 1 x daily - 7 x weekly - 1 sets - 10 reps - 3 sec hold  - Supine Posterior Pelvic Tilt  - 1 x daily - 7 x weekly - 1 sets - 10 reps - 5 sec hold  - Hooklying Gluteal Sets  - 1 x daily - 7 x weekly - 1 sets - 10 reps - 5 sec hold  - Hooklying Transversus Abdominis Palpation  - 1 x daily - 7 x weekly - 1 sets - 10 reps - 5 sec hold  - Hooklying Clamshell with Resistance  - 1 x daily - 7 x weekly - 1 sets - 10 reps - 5 sec hold  - Supine Hip Adduction Isometric with Ball  - 1 x daily - 7 x weekly - 1 sets - 10 reps - 5 sec hold  - Supine with Legs Supported at 90/90  - 3 x daily - 7 x weekly - 10-15 min hold      Assessment/Plan  Doing well with exercises in clinic and with home program. Continues to note decreased symptoms with positional distraction.    Progress per Plan of Care and  Progress strengthening /stabilization /functional activity.  Assess response to today's interventions.  Assess response to home exercise program.           Manual Therapy:         mins  62633;  Therapeutic Exercise:    20     mins  94359;     Neuromuscular Lisa:    10    mins  06608;    Therapeutic Activity:    10    mins  07446;     Gait Training:           mins  16695;     Ultrasound:          mins  85476;    Electrical Stimulation:         mins  55614 ( );  Dry Needling         mins self-pay    Timed Treatment:   40   mins   Total Treatment:     40   mins    Nick Lin PT  Physical Therapist

## 2024-10-21 NOTE — PROGRESS NOTES
Physical Therapy Treatment Note  [facility] [place]  [address], [address]  [place] [address]    Patient: Manoj Cleary   : 1965  Referring practitioner: RHONDA Martinez  Date of Initial Visit: Type: THERAPY  Noted: 2024  Today's Date: 10/9/2024  Patient seen for 6 sessions    Diagnosis/ICD-10 Codes:       ICD-10-CM ICD-9-CM   1. Chronic radicular pain of lower back  M54.16 724.4    G89.29 338.29              Subjective     Manoj Cleary reports: His back is doing very well.  Feels like he is tolerating most activities well and managing his condition with home program pretty well.    Objective   See Exercise, Manual, and Modality Logs for complete treatment.     Continued therapeutic exercises and therapeutic activities.      Continued positional distraction.        Assessment/Plan     Good improvement visit over visit with decreasing pain and improved tolerance to activity.     Move next appointment out for 2 weeks and if continues to do well see and assess for appropriateness for discharge.        Manual Therapy:                 mins  90283;  Therapeutic Exercise:    20     mins  31585;     Neuromuscular Lisa:    10    mins  91255;    Therapeutic Activity:     10    mins  94409;     Gait Training:                      mins  12969;     Ultrasound:                          mins  05018;    Electrical Stimulation:         mins  62851 ( );  Dry Needling                      mins self-pay     Timed Treatment:   40   mins   Total Treatment:     40   min    Nick Lin PT  Physical Therapist

## 2024-10-22 ENCOUNTER — TREATMENT (OUTPATIENT)
Dept: PHYSICAL THERAPY | Facility: CLINIC | Age: 59
End: 2024-10-22
Payer: OTHER GOVERNMENT

## 2024-10-22 DIAGNOSIS — G89.29 CHRONIC RADICULAR PAIN OF LOWER BACK: Primary | ICD-10-CM

## 2024-10-22 DIAGNOSIS — M54.16 CHRONIC RADICULAR PAIN OF LOWER BACK: Primary | ICD-10-CM

## 2024-10-22 NOTE — PROGRESS NOTES
PT Discharge Summary   [facility] [place]  [address]  [place] [address]  Patient: Manoj Cleary   : 1965  Referring practitioner: RHONDA Martinez  Date of Initial Visit: Episode Type: THERAPY  Noted: 2024    Today's Date: 10/22/2024  Patient seen for 7 sessions.    Diagnosis/ICD-10 Codes:     ICD-10-CM ICD-9-CM   1. Chronic radicular pain of lower back  M54.16 724.4    G89.29 338.29         Subjective:       Clinical Progress: improved  Home Program Compliance: Yes  Treatment has included: therapeutic exercise, neuromuscular re-education, and therapeutic activity    Subjective     Manoj Cleary reports: Hs is doing much better.  No radiating leg pain and no significant back pain.  Voices readiness for discharge.      Pain  Current pain ratin  At best pain ratin-2  At worst pain ratin  Quality: dull ache   Alleviating factors: motrin. elevation of leg, leaning forward.  Exacerbated by: extended walking.  Progression: worsening      Objective     Active Range of Motion      Lumbar   Flexion: WFL  Extension: WFL   Left lateral flexion: WFL  Right lateral flexion: WFL  Left rotation: WFL  Right rotation: WFL     Strength/Myotome Testing      Left Hip   Planes of Motion   Flexion: 5  Extension: 5  Abduction: 5  Adduction: 5     Right Hip   Planes of Motion   Flexion: 5  Extension: 5  Abduction: 5  Adduction: 5     Left Knee   Flexion: 5  Extension: 5     Right Knee   Flexion: 5  Extension: 5     Left Ankle/Foot   Dorsiflexion: 5  Plantar flexion: 5  Great toe extension: 5     Right Ankle/Foot   Dorsiflexion: 5  Plantar flexion: 5  Great toe extension: 5     Tests      Lumbar      Left   Negative passive SLR.      Right   Negative  passive SLR.      Lumbar Flexibility Comments:   Improved flexibility B hips      Assessment/Plan  Manoj Cleary is doing very well with only occasional, mild symptoms.  He feels he can manage his symptoms with the independent home  program from therapy and voices readiness for d/c.     Progress toward previous goals: Partially Met    Goals    Short-term goals (STG): 4/5  Long-term goals (LTG): 5/6    STG (6 weeks)  1) Independent in home program for lower extremity and core strengthening (MET)   2) Independent in home program for posture correction (MET)   3) Demonstrates basic understanding of condition and role in treatment progression (MET)   4) Tolerates initiation of resistive lower extremity and core strengthening  (MET)   5) Demonstrates moderate improvement in tolerance to daily activity as evidenced by Oswestry disability score of 20% or less.     LTG (12 Weeks)  1) Independent in home program for self-management of  condition (MET)   2) Demonstrates substantial improvement in tolerance to daily activity as evidenced by Oswestry disability score of 10% or less  3) Demonstrates MMT for trunk flexion and extension of 5/5  to allow for improved spinal stability and improved tolerance to activities such as transfers and bending. (MET)   4)  Demonstrates normal lumbar spinal mechanics to allow for pain-free motion of the lumbar spine. (MET)   5) Demonstrates good posture in sitting to improve back ergonomics (MET)   6) Reports ability to return to return to walking for exercise without increasing pain and without mobility issues (MET)         Recommendations: Discharge      PT Signature: Nick Lin PT      Manual Therapy:                 mins  44345;  Therapeutic Exercise:    20     mins  41383;     Neuromuscular Lisa:    10    mins  98594;    Therapeutic Activity:     10    mins  44932;     Gait Training:                      mins  53045;     Ultrasound:                          mins  94382;    Electrical Stimulation:         mins  68689 ( );  Dry Needling                      mins self-pay     Timed Treatment:   40   mins   Total Treatment:     40   min

## 2024-10-22 NOTE — LETTER
PT Discharge Summary   [facility] [place]  [address]  [place] [address]  Patient: Manoj Cleary   : 1965  Referring practitioner: RHONDA Martinez  Date of Initial Visit: Episode Type: THERAPY  Noted: 2024    Today's Date: 10/22/2024  Patient seen for 7 sessions.    Diagnosis/ICD-10 Codes:     ICD-10-CM ICD-9-CM   1. Chronic radicular pain of lower back  M54.16 724.4    G89.29 338.29         Subjective:       Clinical Progress: improved  Home Program Compliance: Yes  Treatment has included: therapeutic exercise, neuromuscular re-education, and therapeutic activity    Subjective     Manoj Cleary reports: Hs is doing much better.  No radiating leg pain and no significant back pain.  Voices readiness for discharge.      Pain  Current pain ratin  At best pain ratin-2  At worst pain ratin  Quality: dull ache   Alleviating factors: motrin. elevation of leg, leaning forward.  Exacerbated by: extended walking.  Progression: worsening      Objective     Active Range of Motion      Lumbar   Flexion: WFL  Extension: WFL   Left lateral flexion: WFL  Right lateral flexion: WFL  Left rotation: WFL  Right rotation: WFL     Strength/Myotome Testing      Left Hip   Planes of Motion   Flexion: 5  Extension: 5  Abduction: 5  Adduction: 5     Right Hip   Planes of Motion   Flexion: 5  Extension: 5  Abduction: 5  Adduction: 5     Left Knee   Flexion: 5  Extension: 5     Right Knee   Flexion: 5  Extension: 5     Left Ankle/Foot   Dorsiflexion: 5  Plantar flexion: 5  Great toe extension: 5     Right Ankle/Foot   Dorsiflexion: 5  Plantar flexion: 5  Great toe extension: 5     Tests      Lumbar      Left   Negative passive SLR.      Right   Negative  passive SLR.      Lumbar Flexibility Comments:   Improved flexibility B hips      Assessment/Plan  Manoj Cleary is doing very well with only occasional, mild symptoms.  He feels he can manage his symptoms with the independent home  program from therapy and voices readiness for d/c.     Progress toward previous goals: Partially Met    Goals    Short-term goals (STG): 4/5  Long-term goals (LTG): 5/6    STG (6 weeks)  1) Independent in home program for lower extremity and core strengthening (MET)   2) Independent in home program for posture correction (MET)   3) Demonstrates basic understanding of condition and role in treatment progression (MET)   4) Tolerates initiation of resistive lower extremity and core strengthening  (MET)   5) Demonstrates moderate improvement in tolerance to daily activity as evidenced by Oswestry disability score of 20% or less.     LTG (12 Weeks)  1) Independent in home program for self-management of  condition (MET)   2) Demonstrates substantial improvement in tolerance to daily activity as evidenced by Oswestry disability score of 10% or less  3) Demonstrates MMT for trunk flexion and extension of 5/5  to allow for improved spinal stability and improved tolerance to activities such as transfers and bending. (MET)   4)  Demonstrates normal lumbar spinal mechanics to allow for pain-free motion of the lumbar spine. (MET)   5) Demonstrates good posture in sitting to improve back ergonomics (MET)   6) Reports ability to return to return to walking for exercise without increasing pain and without mobility issues (MET)         Recommendations: Discharge      PT Signature: Nick Lin PT    Thank you for allowing us to care for your patient!

## 2024-10-30 ENCOUNTER — OFFICE VISIT (OUTPATIENT)
Dept: FAMILY MEDICINE CLINIC | Facility: CLINIC | Age: 59
End: 2024-10-30
Payer: OTHER GOVERNMENT

## 2024-10-30 VITALS
DIASTOLIC BLOOD PRESSURE: 110 MMHG | WEIGHT: 202 LBS | SYSTOLIC BLOOD PRESSURE: 164 MMHG | BODY MASS INDEX: 29.92 KG/M2 | HEIGHT: 69 IN | HEART RATE: 75 BPM | OXYGEN SATURATION: 99 %

## 2024-10-30 DIAGNOSIS — I10 UNCONTROLLED HYPERTENSION: ICD-10-CM

## 2024-10-30 DIAGNOSIS — I10 ESSENTIAL HYPERTENSION: ICD-10-CM

## 2024-10-30 DIAGNOSIS — I10 ESSENTIAL HYPERTENSION: Primary | ICD-10-CM

## 2024-10-30 PROCEDURE — 99213 OFFICE O/P EST LOW 20 MIN: CPT | Performed by: NURSE PRACTITIONER

## 2024-10-30 RX ORDER — LISINOPRIL 40 MG/1
40 TABLET ORAL DAILY
Qty: 90 TABLET | Refills: 1 | Status: SHIPPED | OUTPATIENT
Start: 2024-10-30

## 2024-10-30 RX ORDER — HYDROCHLOROTHIAZIDE 12.5 MG/1
12.5 TABLET ORAL DAILY
Qty: 30 TABLET | Refills: 1 | Status: SHIPPED | OUTPATIENT
Start: 2024-10-30 | End: 2024-10-31

## 2024-10-30 RX ORDER — METOPROLOL SUCCINATE 50 MG/1
50 TABLET, EXTENDED RELEASE ORAL DAILY
Qty: 90 TABLET | Refills: 1 | Status: SHIPPED | OUTPATIENT
Start: 2024-10-30 | End: 2025-04-28

## 2024-10-30 NOTE — PROGRESS NOTES
Chief Complaint  Hypertension (4 week follow up )  Answers submitted by the patient for this visit:  Primary Reason for Visit (Submitted on 10/23/2024)  What is the primary reason for your visit?: High Blood Pressure    Subjective          Manoj HORNE Tellolaurence presents to [facility]  History of Present Illness      Is here today for follow up on HTN    On 9/4/24 he was on amlodipine 5 mg and metoprolol 25 mg bid, BP was 200/120, metoprolol was increased to 50 mg bid (he had not been taking the amlodipine for a few weeks at this time)  On 9/18/24 he returned for follow up, his BP was 182/110, metoprolol 50 bid was continued, lisinopril 20 mg qd was added   On 10/2/24 he again returned for follow up, /100, lisinopril was increased to 40 mg daily (20 mg bid), metoprolol 50 mg bid continued  At all visits he has been encouraged to work on lifestyle modification  He was started on a statin for his elevated lipids    Today, he continues to have readings 150-180s/ at home, with elevations over that in office 164/110 today    He continues to have headaches frequently    He has been able to make some changes to his diet, has cut processed foods, is trying to stay hydrated    He has not gotten into any exercise routine, he has felt too tired most days to do any exercise    He has been taking lisinopril 20 mg BID and metoprolol 50 mg BID  He endorses that the first few days on the increased lisinopril he felt a little light headed in the morning, but that is resolved  His blood pressure is down some at home, but is consistently elevated  He has been on amlodipine in the past but it caused him to have ankle edema and so It was discontinued    Will try lisinopril 40 qd and metoprolol 50 xr qd and add hydrochlorothiazide 12.5 mg  Also today will refer for cardiology evaluation    Review of Systems   Constitutional:  Positive for fatigue.   Respiratory: Negative.  Negative for shortness of  "breath.    Cardiovascular: Negative.  Negative for chest pain and palpitations.   Neurological:  Positive for headaches. Negative for dizziness and light-headedness.   Psychiatric/Behavioral:  Negative for sleep disturbance.      Objective   Vital Signs:  BP (!) 164/110 (BP Location: Right arm, Patient Position: Sitting)   Pulse 75   Ht 175.3 cm (69\")   Wt 91.6 kg (202 lb)   SpO2 99%   BMI 29.83 kg/m²     BP Readings from Last 3 Encounters:   10/30/24 (!) 164/110   10/02/24 162/100   09/18/24 (!) 182/110        Wt Readings from Last 3 Encounters:   10/30/24 91.6 kg (202 lb)   10/02/24 91.4 kg (201 lb 9.6 oz)   09/18/24 91.7 kg (202 lb 3.2 oz)      Physical Exam  Vitals reviewed.   Constitutional:       Appearance: Normal appearance.   Neck:      Vascular: No carotid bruit.   Cardiovascular:      Rate and Rhythm: Normal rate and regular rhythm.      Pulses: Normal pulses.      Heart sounds: Normal heart sounds.   Pulmonary:      Effort: Pulmonary effort is normal.      Breath sounds: Normal breath sounds.   Musculoskeletal:      Cervical back: Neck supple.      Right lower leg: No edema.      Left lower leg: No edema.   Skin:     General: Skin is warm.   Neurological:      Mental Status: He is alert and oriented to person, place, and time.        Result Review :     CMP          5/8/2024    12:22 9/4/2024    15:16 9/18/2024    14:31   CMP   Glucose 92  98  108    BUN 7  11  9    Creatinine 0.86  0.98  0.81    EGFR 99.7  88.3  100.9    Sodium 136  130  136    Potassium 4.2  4.2  4.2    Chloride 99  91  95    Calcium 9.4  10.2  10.2    Total Protein  8.3  7.8    Albumin  4.8  4.9    Globulin  3.5  2.9    Total Bilirubin  0.7  0.6    Alkaline Phosphatase  100  111    AST (SGOT)  68  62    ALT (SGPT)  82  76    Albumin/Globulin Ratio  1.4  1.7    BUN/Creatinine Ratio 8.1  11.2  11.1    Anion Gap 15.3  13.8  17.0      CBC          12/29/2023    13:44 12/30/2023    12:10 9/4/2024    15:16   CBC   WBC 7.00  6.80  4.61  "   RBC 3.90  3.80  4.89    Hemoglobin 13.3  13.0  16.3    Hematocrit 38.4  37.4  47.6    MCV 98.3  98.5  97.3    MCH 34.1  34.3  33.3    MCHC 34.7  34.8  34.2    RDW 12.4  12.6  12.7    Platelets 206  259  284      Lipid Panel          1/3/2024    08:37 9/4/2024    15:16 9/18/2024    14:31   Lipid Panel   Total Cholesterol 196  334  338    Triglycerides 87  217  293    HDL Cholesterol 40  55  50    VLDL Cholesterol 16  43  61    LDL Cholesterol  140  236  227    LDL/HDL Ratio 3.47  4.28  4.59      TSH          12/27/2023    22:11 1/24/2024    08:36 9/4/2024    15:16   TSH   TSH 5.120  2.920  3.750                Assessment and Plan    Diagnoses and all orders for this visit:    1. Essential hypertension (Primary)  -     metoprolol succinate XL (Toprol XL) 50 MG 24 hr tablet; Take 1 tablet by mouth Daily for 180 days.  Dispense: 90 tablet; Refill: 1  -     lisinopril (PRINIVIL,ZESTRIL) 40 MG tablet; Take 1 tablet by mouth Daily.  Dispense: 90 tablet; Refill: 1  -     hydroCHLOROthiazide 12.5 MG tablet; Take 1 tablet by mouth Daily.  Dispense: 30 tablet; Refill: 1    2. Uncontrolled hypertension  -     Ambulatory Referral to Cardiology           Follow Up   Return in about 4 weeks (around 11/27/2024) for follow up to be determined.  Patient was given instructions and counseling regarding his condition or for health maintenance advice. Please see specific information pulled into the AVS if appropriate.

## 2024-10-31 RX ORDER — HYDROCHLOROTHIAZIDE 12.5 MG/1
12.5 TABLET ORAL DAILY
Qty: 90 TABLET | Refills: 1 | Status: SHIPPED | OUTPATIENT
Start: 2024-10-31

## 2025-01-13 ENCOUNTER — TELEPHONE (OUTPATIENT)
Dept: FAMILY MEDICINE CLINIC | Facility: CLINIC | Age: 60
End: 2025-01-13
Payer: OTHER GOVERNMENT

## 2025-01-13 NOTE — TELEPHONE ENCOUNTER
We referred patient to [facility] and he is having allergy testing on 01/22/2025.     They need us to do a referral for a follow up appt on 01/23/2025 and then for 52 injections for the year.    They said you might have trouble getting the referrals authorized because when she pulled patient up on the  website it showed Dr Kelley as his provider but she is a pediatrician.    Please notify Katiana with [facility] once you get referrals taken care of.

## 2025-01-15 NOTE — TELEPHONE ENCOUNTER
Does this make sense to you?  Is it a  thing?  I have never had to refer specifically for allergy shots.

## 2025-02-20 DIAGNOSIS — J30.9 ALLERGIC RHINITIS, UNSPECIFIED SEASONALITY, UNSPECIFIED TRIGGER: Primary | ICD-10-CM

## 2025-02-20 DIAGNOSIS — Z51.6 NEED FOR DESENSITIZATION TO ALLERGENS: ICD-10-CM

## 2025-02-20 DIAGNOSIS — R09.81 NASAL SINUS CONGESTION: ICD-10-CM

## 2025-04-17 ENCOUNTER — OFFICE VISIT (OUTPATIENT)
Dept: FAMILY MEDICINE CLINIC | Facility: CLINIC | Age: 60
End: 2025-04-17
Payer: OTHER GOVERNMENT

## 2025-04-17 ENCOUNTER — LAB (OUTPATIENT)
Dept: FAMILY MEDICINE CLINIC | Facility: CLINIC | Age: 60
End: 2025-04-17
Payer: OTHER GOVERNMENT

## 2025-04-17 VITALS
SYSTOLIC BLOOD PRESSURE: 128 MMHG | RESPIRATION RATE: 18 BRPM | WEIGHT: 202.4 LBS | DIASTOLIC BLOOD PRESSURE: 73 MMHG | HEIGHT: 69 IN | BODY MASS INDEX: 29.98 KG/M2 | HEART RATE: 77 BPM | OXYGEN SATURATION: 100 %

## 2025-04-17 DIAGNOSIS — E78.5 HYPERLIPIDEMIA, UNSPECIFIED HYPERLIPIDEMIA TYPE: ICD-10-CM

## 2025-04-17 DIAGNOSIS — I10 ESSENTIAL HYPERTENSION: ICD-10-CM

## 2025-04-17 DIAGNOSIS — I10 ESSENTIAL HYPERTENSION: Primary | ICD-10-CM

## 2025-04-17 LAB
ALBUMIN SERPL-MCNC: 3.9 G/DL (ref 3.5–5.2)
ALBUMIN/GLOB SERPL: 1.1 G/DL
ALP SERPL-CCNC: 96 U/L (ref 39–117)
ALT SERPL W P-5'-P-CCNC: 30 U/L (ref 1–41)
ANION GAP SERPL CALCULATED.3IONS-SCNC: 11.4 MMOL/L (ref 5–15)
AST SERPL-CCNC: 32 U/L (ref 1–40)
BILIRUB SERPL-MCNC: 0.3 MG/DL (ref 0–1.2)
BUN SERPL-MCNC: 14 MG/DL (ref 8–23)
BUN/CREAT SERPL: 10.5 (ref 7–25)
CALCIUM SPEC-SCNC: 9.9 MG/DL (ref 8.6–10.5)
CHLORIDE SERPL-SCNC: 96 MMOL/L (ref 98–107)
CHOLEST SERPL-MCNC: 210 MG/DL (ref 0–200)
CO2 SERPL-SCNC: 26.6 MMOL/L (ref 22–29)
CREAT SERPL-MCNC: 1.33 MG/DL (ref 0.76–1.27)
EGFRCR SERPLBLD CKD-EPI 2021: 61.2 ML/MIN/1.73
GLOBULIN UR ELPH-MCNC: 3.6 GM/DL
GLUCOSE SERPL-MCNC: 106 MG/DL (ref 65–99)
HDLC SERPL-MCNC: 56 MG/DL (ref 40–60)
LDLC SERPL CALC-MCNC: 121 MG/DL (ref 0–100)
LDLC/HDLC SERPL: 2.07 {RATIO}
POTASSIUM SERPL-SCNC: 4.4 MMOL/L (ref 3.5–5.2)
PROT SERPL-MCNC: 7.5 G/DL (ref 6–8.5)
SODIUM SERPL-SCNC: 134 MMOL/L (ref 136–145)
TRIGL SERPL-MCNC: 190 MG/DL (ref 0–150)
VLDLC SERPL-MCNC: 33 MG/DL (ref 5–40)

## 2025-04-17 PROCEDURE — 36415 COLL VENOUS BLD VENIPUNCTURE: CPT

## 2025-04-17 PROCEDURE — 99213 OFFICE O/P EST LOW 20 MIN: CPT | Performed by: NURSE PRACTITIONER

## 2025-04-17 PROCEDURE — 80061 LIPID PANEL: CPT | Performed by: NURSE PRACTITIONER

## 2025-04-17 PROCEDURE — 80053 COMPREHEN METABOLIC PANEL: CPT | Performed by: NURSE PRACTITIONER

## 2025-04-17 RX ORDER — ROSUVASTATIN CALCIUM 5 MG/1
5 TABLET, COATED ORAL DAILY
Qty: 90 TABLET | Refills: 1 | Status: SHIPPED | OUTPATIENT
Start: 2025-04-17

## 2025-04-17 RX ORDER — METOPROLOL SUCCINATE 50 MG/1
50 TABLET, EXTENDED RELEASE ORAL DAILY
Qty: 90 TABLET | Refills: 1 | Status: SHIPPED | OUTPATIENT
Start: 2025-04-17 | End: 2025-10-14

## 2025-04-17 RX ORDER — LISINOPRIL 40 MG/1
40 TABLET ORAL DAILY
Qty: 90 TABLET | Refills: 1 | Status: SHIPPED | OUTPATIENT
Start: 2025-04-17

## 2025-04-17 RX ORDER — HYDROCHLOROTHIAZIDE 12.5 MG/1
12.5 TABLET ORAL DAILY
Qty: 90 TABLET | Refills: 1 | Status: SHIPPED | OUTPATIENT
Start: 2025-04-17

## 2025-04-17 NOTE — PROGRESS NOTES
"Chief Complaint  Hypertension (1 month follow up)    Subjective          Manoj Cleary presents to [facility]  Hypertension  This is a chronic problem. The current episode started more than 1 year ago. The problem has been improved since onset. Pertinent negatives include no anxiety, blurred vision, chest pain, headaches, malaise/fatigue, orthopnea, palpitations, peripheral edema or shortness of breath. Agents associated with hypertension include NSAIDs and steroids. There are no compliance problems.      Is here today for follow up HTN    He endorses that his BP has been better   He denies any c/o chest pain, soa, edema, ha, weakness, or dizziness, or visual disturbances    Has been having a difficult time getting scheduled with cardiology  Discussed cardiac calcium score and vascular screening bundle - declines at this time but may call for this order at a later date    Review of Systems   Constitutional: Negative.  Negative for activity change, appetite change, fatigue and malaise/fatigue.   Eyes:  Negative for blurred vision.   Respiratory: Negative.  Negative for shortness of breath.    Cardiovascular: Negative.  Negative for chest pain, palpitations and orthopnea.   Neurological: Negative.  Negative for headaches.     Objective   Vital Signs:  /73   Pulse 77   Resp 18   Ht 175.3 cm (69\")   Wt 91.8 kg (202 lb 6.4 oz)   SpO2 100%   BMI 29.89 kg/m²     BP Readings from Last 3 Encounters:   04/17/25 128/73   10/30/24 (!) 164/110   10/02/24 162/100        Wt Readings from Last 3 Encounters:   04/17/25 91.8 kg (202 lb 6.4 oz)   10/30/24 91.6 kg (202 lb)   10/02/24 91.4 kg (201 lb 9.6 oz)        Physical Exam  Vitals reviewed.   Constitutional:       Appearance: Normal appearance.   Neck:      Vascular: No carotid bruit.   Cardiovascular:      Rate and Rhythm: Normal rate and regular rhythm.      Pulses: Normal pulses.      Heart sounds: Normal heart sounds. "   Pulmonary:      Effort: Pulmonary effort is normal.      Breath sounds: Normal breath sounds.   Musculoskeletal:      Cervical back: Neck supple.      Right lower leg: No edema.      Left lower leg: No edema.   Skin:     General: Skin is warm.   Neurological:      Mental Status: He is alert and oriented to person, place, and time.        Result Review :     CMP          5/8/2024    12:22 9/4/2024    15:16 9/18/2024    14:31   CMP   Glucose 92  98  108    BUN 7  11  9    Creatinine 0.86  0.98  0.81    EGFR 99.7  88.3  100.9    Sodium 136  130  136    Potassium 4.2  4.2  4.2    Chloride 99  91  95    Calcium 9.4  10.2  10.2    Total Protein  8.3  7.8    Albumin  4.8  4.9    Globulin  3.5  2.9    Total Bilirubin  0.7  0.6    Alkaline Phosphatase  100  111    AST (SGOT)  68  62    ALT (SGPT)  82  76    Albumin/Globulin Ratio  1.4  1.7    BUN/Creatinine Ratio 8.1  11.2  11.1    Anion Gap 15.3  13.8  17.0      Lipid Panel          9/4/2024    15:16 9/18/2024    14:31   Lipid Panel   Total Cholesterol 334  338    Triglycerides 217  293    HDL Cholesterol 55  50    VLDL Cholesterol 43  61    LDL Cholesterol  236  227    LDL/HDL Ratio 4.28  4.59      TSH          9/4/2024    15:16   TSH   TSH 3.750      A1C Last 3 Results          9/4/2024    15:16   HGBA1C Last 3 Results   Hemoglobin A1C 5.70                Assessment and Plan    Diagnoses and all orders for this visit:    1. Essential hypertension (Primary)  -     hydroCHLOROthiazide 12.5 MG tablet; Take 1 tablet by mouth Daily.  Dispense: 90 tablet; Refill: 1  -     lisinopril (PRINIVIL,ZESTRIL) 40 MG tablet; Take 1 tablet by mouth Daily.  Dispense: 90 tablet; Refill: 1  -     metoprolol succinate XL (Toprol XL) 50 MG 24 hr tablet; Take 1 tablet by mouth Daily for 180 days.  Dispense: 90 tablet; Refill: 1  -     Comprehensive metabolic panel; Future    2. Hyperlipidemia, unspecified hyperlipidemia type  -     rosuvastatin (Crestor) 5 MG tablet; Take 1 tablet by mouth  Daily.  Dispense: 90 tablet; Refill: 1  -     Comprehensive metabolic panel; Future  -     Lipid panel; Future             Follow Up   Return in about 5 months (around 9/17/2025) for Annual physical.  Patient was given instructions and counseling regarding his condition or for health maintenance advice. Please see specific information pulled into the AVS if appropriate.